# Patient Record
Sex: FEMALE | Race: WHITE | Employment: OTHER | URBAN - METROPOLITAN AREA
[De-identification: names, ages, dates, MRNs, and addresses within clinical notes are randomized per-mention and may not be internally consistent; named-entity substitution may affect disease eponyms.]

---

## 2017-01-04 ENCOUNTER — OFFICE VISIT (OUTPATIENT)
Dept: PAIN MANAGEMENT | Age: 62
End: 2017-01-04

## 2017-01-04 VITALS
BODY MASS INDEX: 42.4 KG/M2 | DIASTOLIC BLOOD PRESSURE: 66 MMHG | WEIGHT: 247 LBS | SYSTOLIC BLOOD PRESSURE: 137 MMHG | HEART RATE: 96 BPM

## 2017-01-04 DIAGNOSIS — G89.4 CHRONIC PAIN SYNDROME: ICD-10-CM

## 2017-01-04 DIAGNOSIS — M79.7 FIBROMYALGIA: ICD-10-CM

## 2017-01-04 DIAGNOSIS — M47.899 FACET JOINT SYNDROME: ICD-10-CM

## 2017-01-04 DIAGNOSIS — F51.01 PRIMARY INSOMNIA: ICD-10-CM

## 2017-01-04 PROCEDURE — 99213 OFFICE O/P EST LOW 20 MIN: CPT | Performed by: INTERNAL MEDICINE

## 2017-01-04 RX ORDER — BUPRENORPHINE 7.5 UG/H
1 PATCH TRANSDERMAL
Qty: 4 PATCH | Refills: 0 | Status: SHIPPED | OUTPATIENT
Start: 2017-01-04 | End: 2019-05-03

## 2017-01-04 RX ORDER — DIVALPROEX SODIUM 125 MG/1
125 CAPSULE, COATED PELLETS ORAL
COMMUNITY
End: 2019-05-03

## 2019-05-03 ENCOUNTER — APPOINTMENT (OUTPATIENT)
Dept: GENERAL RADIOLOGY | Age: 64
DRG: 287 | End: 2019-05-03
Payer: COMMERCIAL

## 2019-05-03 ENCOUNTER — HOSPITAL ENCOUNTER (INPATIENT)
Age: 64
LOS: 2 days | Discharge: HOME | DRG: 287 | End: 2019-05-07
Attending: EMERGENCY MEDICINE | Admitting: INTERNAL MEDICINE
Payer: COMMERCIAL

## 2019-05-03 DIAGNOSIS — R42 LIGHTHEADED: ICD-10-CM

## 2019-05-03 DIAGNOSIS — R07.9 CHEST PAIN, UNSPECIFIED TYPE: Primary | ICD-10-CM

## 2019-05-03 DIAGNOSIS — D64.9 ANEMIA, UNSPECIFIED TYPE: ICD-10-CM

## 2019-05-03 DIAGNOSIS — R06.00 DYSPNEA, UNSPECIFIED TYPE: ICD-10-CM

## 2019-05-03 PROBLEM — N19 ACUTE PRERENAL AZOTEMIA: Status: ACTIVE | Noted: 2019-05-03

## 2019-05-03 PROBLEM — N17.9 ACUTE RENAL FAILURE (ARF) (HCC): Status: ACTIVE | Noted: 2019-05-03

## 2019-05-03 PROBLEM — R07.89 CHEST TIGHTNESS: Status: ACTIVE | Noted: 2019-05-03

## 2019-05-03 PROBLEM — E86.0 DEHYDRATION: Status: ACTIVE | Noted: 2019-05-03

## 2019-05-03 LAB
ANION GAP SERPL CALCULATED.3IONS-SCNC: 16 MMOL/L (ref 3–16)
BASOPHILS ABSOLUTE: 0 K/UL (ref 0–0.2)
BASOPHILS RELATIVE PERCENT: 0.3 %
BILIRUBIN URINE: NEGATIVE
BLOOD, URINE: ABNORMAL
BUN BLDV-MCNC: 23 MG/DL (ref 7–20)
CALCIUM SERPL-MCNC: 8.7 MG/DL (ref 8.3–10.6)
CHLORIDE BLD-SCNC: 106 MMOL/L (ref 99–110)
CLARITY: CLEAR
CO2: 18 MMOL/L (ref 21–32)
COLOR: YELLOW
CREAT SERPL-MCNC: 1.3 MG/DL (ref 0.6–1.2)
D DIMER: 210 NG/ML DDU (ref 0–229)
EOSINOPHILS ABSOLUTE: 0 K/UL (ref 0–0.6)
EOSINOPHILS RELATIVE PERCENT: 0.1 %
EPITHELIAL CELLS, UA: 1 /HPF (ref 0–5)
FERRITIN: 23.2 NG/ML (ref 15–150)
FOLATE: >20 NG/ML (ref 4.78–24.2)
GFR AFRICAN AMERICAN: 50
GFR NON-AFRICAN AMERICAN: 41
GLUCOSE BLD-MCNC: 100 MG/DL (ref 70–99)
GLUCOSE URINE: NEGATIVE MG/DL
HCT VFR BLD CALC: 28.5 % (ref 36–48)
HCT VFR BLD CALC: 28.7 % (ref 36–48)
HEMOGLOBIN: 8.6 G/DL (ref 12–16)
HYALINE CASTS: 0 /LPF (ref 0–8)
IMMATURE RETIC FRACT: 0.42 (ref 0.21–0.37)
IRON SATURATION: 9 % (ref 15–50)
IRON: 43 UG/DL (ref 37–145)
KETONES, URINE: NEGATIVE MG/DL
LACTIC ACID: 1.8 MMOL/L (ref 0.4–2)
LEUKOCYTE ESTERASE, URINE: NEGATIVE
LYMPHOCYTES ABSOLUTE: 1.3 K/UL (ref 1–5.1)
LYMPHOCYTES RELATIVE PERCENT: 11.7 %
MCH RBC QN AUTO: 27.8 PG (ref 26–34)
MCHC RBC AUTO-ENTMCNC: 30 G/DL (ref 31–36)
MCV RBC AUTO: 92.6 FL (ref 80–100)
MICROSCOPIC EXAMINATION: YES
MONOCYTES ABSOLUTE: 1 K/UL (ref 0–1.3)
MONOCYTES RELATIVE PERCENT: 8.7 %
NEUTROPHILS ABSOLUTE: 8.9 K/UL (ref 1.7–7.7)
NEUTROPHILS RELATIVE PERCENT: 79.2 %
NITRITE, URINE: NEGATIVE
PDW BLD-RTO: 14.3 % (ref 12.4–15.4)
PH UA: 5.5 (ref 5–8)
PLATELET # BLD: 183 K/UL (ref 135–450)
PLATELET SLIDE REVIEW: ADEQUATE
PMV BLD AUTO: 7.7 FL (ref 5–10.5)
POTASSIUM REFLEX MAGNESIUM: 4.4 MMOL/L (ref 3.5–5.1)
PRO-BNP: 714 PG/ML (ref 0–124)
PROTEIN UA: NEGATIVE MG/DL
RBC # BLD: 3.1 M/UL (ref 4–5.2)
RBC UA: 7 /HPF (ref 0–4)
RETICULOCYTE ABSOLUTE COUNT: 0.09 M/UL (ref 0.02–0.1)
RETICULOCYTE COUNT PCT: 3.02 % (ref 0.5–2.18)
SLIDE REVIEW: ABNORMAL
SODIUM BLD-SCNC: 140 MMOL/L (ref 136–145)
SPECIFIC GRAVITY UA: 1.01 (ref 1–1.03)
TOTAL IRON BINDING CAPACITY: 477 UG/DL (ref 260–445)
TROPONIN: <0.01 NG/ML
TROPONIN: <0.01 NG/ML
URINE REFLEX TO CULTURE: ABNORMAL
URINE TYPE: ABNORMAL
UROBILINOGEN, URINE: 0.2 E.U./DL
VITAMIN B-12: 441 PG/ML (ref 211–911)
WBC # BLD: 11.2 K/UL (ref 4–11)
WBC UA: 1 /HPF (ref 0–5)

## 2019-05-03 PROCEDURE — 85045 AUTOMATED RETICULOCYTE COUNT: CPT

## 2019-05-03 PROCEDURE — G0378 HOSPITAL OBSERVATION PER HR: HCPCS

## 2019-05-03 PROCEDURE — 83540 ASSAY OF IRON: CPT

## 2019-05-03 PROCEDURE — 6370000000 HC RX 637 (ALT 250 FOR IP): Performed by: PHYSICIAN ASSISTANT

## 2019-05-03 PROCEDURE — 94762 N-INVAS EAR/PLS OXIMTRY CONT: CPT

## 2019-05-03 PROCEDURE — 84238 ASSAY NONENDOCRINE RECEPTOR: CPT

## 2019-05-03 PROCEDURE — 84484 ASSAY OF TROPONIN QUANT: CPT

## 2019-05-03 PROCEDURE — 82607 VITAMIN B-12: CPT

## 2019-05-03 PROCEDURE — 83550 IRON BINDING TEST: CPT

## 2019-05-03 PROCEDURE — 85025 COMPLETE CBC W/AUTO DIFF WBC: CPT

## 2019-05-03 PROCEDURE — 80048 BASIC METABOLIC PNL TOTAL CA: CPT

## 2019-05-03 PROCEDURE — 71046 X-RAY EXAM CHEST 2 VIEWS: CPT

## 2019-05-03 PROCEDURE — 93010 ELECTROCARDIOGRAM REPORT: CPT | Performed by: INTERNAL MEDICINE

## 2019-05-03 PROCEDURE — 83605 ASSAY OF LACTIC ACID: CPT

## 2019-05-03 PROCEDURE — 93005 ELECTROCARDIOGRAM TRACING: CPT | Performed by: PHYSICIAN ASSISTANT

## 2019-05-03 PROCEDURE — 2580000003 HC RX 258: Performed by: INTERNAL MEDICINE

## 2019-05-03 PROCEDURE — 85379 FIBRIN DEGRADATION QUANT: CPT

## 2019-05-03 PROCEDURE — 36415 COLL VENOUS BLD VENIPUNCTURE: CPT

## 2019-05-03 PROCEDURE — 81001 URINALYSIS AUTO W/SCOPE: CPT

## 2019-05-03 PROCEDURE — 96361 HYDRATE IV INFUSION ADD-ON: CPT

## 2019-05-03 PROCEDURE — 82746 ASSAY OF FOLIC ACID SERUM: CPT

## 2019-05-03 PROCEDURE — 96360 HYDRATION IV INFUSION INIT: CPT

## 2019-05-03 PROCEDURE — 82728 ASSAY OF FERRITIN: CPT

## 2019-05-03 PROCEDURE — 6370000000 HC RX 637 (ALT 250 FOR IP): Performed by: INTERNAL MEDICINE

## 2019-05-03 PROCEDURE — 83880 ASSAY OF NATRIURETIC PEPTIDE: CPT

## 2019-05-03 PROCEDURE — 2580000003 HC RX 258: Performed by: PHYSICIAN ASSISTANT

## 2019-05-03 PROCEDURE — 99285 EMERGENCY DEPT VISIT HI MDM: CPT

## 2019-05-03 PROCEDURE — 94640 AIRWAY INHALATION TREATMENT: CPT

## 2019-05-03 RX ORDER — SERTRALINE HYDROCHLORIDE 100 MG/1
200 TABLET, FILM COATED ORAL DAILY
Status: DISCONTINUED | OUTPATIENT
Start: 2019-05-04 | End: 2019-05-07 | Stop reason: HOSPADM

## 2019-05-03 RX ORDER — ERGOCALCIFEROL 1.25 MG/1
50000 CAPSULE ORAL WEEKLY
Status: DISCONTINUED | OUTPATIENT
Start: 2019-05-05 | End: 2019-05-07 | Stop reason: HOSPADM

## 2019-05-03 RX ORDER — ROPINIROLE 0.5 MG/1
0.5 TABLET, FILM COATED ORAL 3 TIMES DAILY
Status: CANCELLED | OUTPATIENT
Start: 2019-05-03

## 2019-05-03 RX ORDER — QUETIAPINE FUMARATE 200 MG/1
800 TABLET, FILM COATED ORAL NIGHTLY
Status: DISCONTINUED | OUTPATIENT
Start: 2019-05-03 | End: 2019-05-07 | Stop reason: HOSPADM

## 2019-05-03 RX ORDER — DIVALPROEX SODIUM 500 MG/1
500 TABLET, EXTENDED RELEASE ORAL 2 TIMES DAILY
COMMUNITY

## 2019-05-03 RX ORDER — TRAMADOL HYDROCHLORIDE 50 MG/1
50 TABLET ORAL EVERY 6 HOURS PRN
COMMUNITY

## 2019-05-03 RX ORDER — ONDANSETRON 2 MG/ML
4 INJECTION INTRAMUSCULAR; INTRAVENOUS EVERY 4 HOURS PRN
Status: DISCONTINUED | OUTPATIENT
Start: 2019-05-03 | End: 2019-05-07 | Stop reason: HOSPADM

## 2019-05-03 RX ORDER — SODIUM CHLORIDE 0.9 % (FLUSH) 0.9 %
10 SYRINGE (ML) INJECTION PRN
Status: DISCONTINUED | OUTPATIENT
Start: 2019-05-03 | End: 2019-05-07 | Stop reason: HOSPADM

## 2019-05-03 RX ORDER — GABAPENTIN 100 MG/1
100 CAPSULE ORAL NIGHTLY
Status: DISCONTINUED | OUTPATIENT
Start: 2019-05-03 | End: 2019-05-07 | Stop reason: HOSPADM

## 2019-05-03 RX ORDER — FOLIC ACID 1 MG/1
1 TABLET ORAL DAILY
Status: DISCONTINUED | OUTPATIENT
Start: 2019-05-04 | End: 2019-05-03

## 2019-05-03 RX ORDER — TIMOLOL MALEATE 5 MG/ML
1 SOLUTION/ DROPS OPHTHALMIC 2 TIMES DAILY
Status: CANCELLED | OUTPATIENT
Start: 2019-05-03

## 2019-05-03 RX ORDER — METOCLOPRAMIDE 10 MG/1
5 TABLET ORAL 4 TIMES DAILY
Status: DISCONTINUED | OUTPATIENT
Start: 2019-05-03 | End: 2019-05-03

## 2019-05-03 RX ORDER — PALIPERIDONE 3 MG/1
6 TABLET, EXTENDED RELEASE ORAL EVERY MORNING
Status: DISCONTINUED | OUTPATIENT
Start: 2019-05-04 | End: 2019-05-07 | Stop reason: HOSPADM

## 2019-05-03 RX ORDER — LEVOTHYROXINE SODIUM 0.05 MG/1
50 TABLET ORAL
Status: DISCONTINUED | OUTPATIENT
Start: 2019-05-04 | End: 2019-05-07 | Stop reason: HOSPADM

## 2019-05-03 RX ORDER — IPRATROPIUM BROMIDE AND ALBUTEROL SULFATE 2.5; .5 MG/3ML; MG/3ML
1 SOLUTION RESPIRATORY (INHALATION) ONCE
Status: COMPLETED | OUTPATIENT
Start: 2019-05-03 | End: 2019-05-03

## 2019-05-03 RX ORDER — TIZANIDINE 4 MG/1
4 TABLET ORAL EVERY 6 HOURS PRN
Status: DISCONTINUED | OUTPATIENT
Start: 2019-05-03 | End: 2019-05-03

## 2019-05-03 RX ORDER — 0.9 % SODIUM CHLORIDE 0.9 %
1000 INTRAVENOUS SOLUTION INTRAVENOUS ONCE
Status: COMPLETED | OUTPATIENT
Start: 2019-05-03 | End: 2019-05-03

## 2019-05-03 RX ORDER — ACETAMINOPHEN 325 MG/1
650 TABLET ORAL EVERY 4 HOURS PRN
Status: DISCONTINUED | OUTPATIENT
Start: 2019-05-03 | End: 2019-05-07 | Stop reason: HOSPADM

## 2019-05-03 RX ORDER — DIVALPROEX SODIUM 500 MG/1
500 TABLET, EXTENDED RELEASE ORAL 2 TIMES DAILY
Status: DISCONTINUED | OUTPATIENT
Start: 2019-05-03 | End: 2019-05-07 | Stop reason: HOSPADM

## 2019-05-03 RX ORDER — PANTOPRAZOLE SODIUM 40 MG/1
40 TABLET, DELAYED RELEASE ORAL
Status: DISCONTINUED | OUTPATIENT
Start: 2019-05-04 | End: 2019-05-04

## 2019-05-03 RX ORDER — ZONISAMIDE 100 MG/1
200 CAPSULE ORAL DAILY
Status: CANCELLED | OUTPATIENT
Start: 2019-05-03

## 2019-05-03 RX ORDER — GABAPENTIN 100 MG/1
100 CAPSULE ORAL NIGHTLY
COMMUNITY

## 2019-05-03 RX ORDER — TRAMADOL HYDROCHLORIDE 50 MG/1
50 TABLET ORAL EVERY 6 HOURS PRN
Status: DISCONTINUED | OUTPATIENT
Start: 2019-05-03 | End: 2019-05-07 | Stop reason: HOSPADM

## 2019-05-03 RX ORDER — BUPROPION HYDROCHLORIDE 150 MG/1
150 TABLET ORAL EVERY MORNING
Status: CANCELLED | OUTPATIENT
Start: 2019-05-04

## 2019-05-03 RX ORDER — PREDNISONE 20 MG/1
60 TABLET ORAL ONCE
Status: COMPLETED | OUTPATIENT
Start: 2019-05-03 | End: 2019-05-03

## 2019-05-03 RX ORDER — QUETIAPINE FUMARATE 200 MG/1
400 TABLET, FILM COATED ORAL 2 TIMES DAILY
Status: CANCELLED | OUTPATIENT
Start: 2019-05-03

## 2019-05-03 RX ORDER — LEVOTHYROXINE SODIUM 0.05 MG/1
50 TABLET ORAL DAILY
COMMUNITY

## 2019-05-03 RX ORDER — DICYCLOMINE HYDROCHLORIDE 10 MG/1
10 CAPSULE ORAL
Status: DISCONTINUED | OUTPATIENT
Start: 2019-05-03 | End: 2019-05-07 | Stop reason: HOSPADM

## 2019-05-03 RX ORDER — METOCLOPRAMIDE 5 MG/1
5 TABLET ORAL 4 TIMES DAILY
COMMUNITY

## 2019-05-03 RX ORDER — TIZANIDINE 4 MG/1
4 TABLET ORAL EVERY 6 HOURS PRN
COMMUNITY

## 2019-05-03 RX ORDER — METHOCARBAMOL 500 MG/1
500 TABLET, FILM COATED ORAL 4 TIMES DAILY
Status: CANCELLED | OUTPATIENT
Start: 2019-05-03

## 2019-05-03 RX ORDER — DOCUSATE SODIUM 100 MG/1
100 CAPSULE, LIQUID FILLED ORAL 2 TIMES DAILY
Status: DISCONTINUED | OUTPATIENT
Start: 2019-05-03 | End: 2019-05-07 | Stop reason: HOSPADM

## 2019-05-03 RX ORDER — GABAPENTIN 100 MG/1
100 CAPSULE ORAL NIGHTLY
Status: DISCONTINUED | OUTPATIENT
Start: 2019-05-03 | End: 2019-05-03

## 2019-05-03 RX ORDER — FOLIC ACID 1 MG/1
1 TABLET ORAL DAILY
Status: DISCONTINUED | OUTPATIENT
Start: 2019-05-04 | End: 2019-05-07 | Stop reason: HOSPADM

## 2019-05-03 RX ORDER — FOLIC ACID 1 MG/1
1 TABLET ORAL DAILY
COMMUNITY

## 2019-05-03 RX ORDER — METOCLOPRAMIDE 10 MG/1
5 TABLET ORAL
Status: DISCONTINUED | OUTPATIENT
Start: 2019-05-03 | End: 2019-05-04

## 2019-05-03 RX ORDER — PALIPERIDONE 6 MG/1
6 TABLET, EXTENDED RELEASE ORAL EVERY MORNING
COMMUNITY

## 2019-05-03 RX ORDER — QUETIAPINE FUMARATE 200 MG/1
800 TABLET, FILM COATED ORAL NIGHTLY
Status: DISCONTINUED | OUTPATIENT
Start: 2019-05-03 | End: 2019-05-03

## 2019-05-03 RX ORDER — SODIUM CHLORIDE 0.9 % (FLUSH) 0.9 %
10 SYRINGE (ML) INJECTION EVERY 12 HOURS SCHEDULED
Status: DISCONTINUED | OUTPATIENT
Start: 2019-05-03 | End: 2019-05-07 | Stop reason: HOSPADM

## 2019-05-03 RX ORDER — PALIPERIDONE 3 MG/1
6 TABLET, EXTENDED RELEASE ORAL DAILY
Status: DISCONTINUED | OUTPATIENT
Start: 2019-05-04 | End: 2019-05-03

## 2019-05-03 RX ORDER — CHOLECALCIFEROL (VITAMIN D3) 1250 MCG
1 CAPSULE ORAL WEEKLY
COMMUNITY

## 2019-05-03 RX ORDER — LEVOTHYROXINE SODIUM 0.05 MG/1
50 TABLET ORAL DAILY
Status: DISCONTINUED | OUTPATIENT
Start: 2019-05-04 | End: 2019-05-03

## 2019-05-03 RX ORDER — DOCUSATE SODIUM 100 MG/1
100 CAPSULE, LIQUID FILLED ORAL 2 TIMES DAILY
COMMUNITY

## 2019-05-03 RX ORDER — SODIUM CHLORIDE 9 MG/ML
INJECTION, SOLUTION INTRAVENOUS CONTINUOUS
Status: DISCONTINUED | OUTPATIENT
Start: 2019-05-03 | End: 2019-05-04

## 2019-05-03 RX ORDER — DIVALPROEX SODIUM 500 MG/1
500 TABLET, EXTENDED RELEASE ORAL 2 TIMES DAILY
Status: DISCONTINUED | OUTPATIENT
Start: 2019-05-03 | End: 2019-05-03

## 2019-05-03 RX ORDER — ROPINIROLE 1 MG/1
1 TABLET, FILM COATED ORAL 3 TIMES DAILY
Status: DISCONTINUED | OUTPATIENT
Start: 2019-05-03 | End: 2019-05-07 | Stop reason: HOSPADM

## 2019-05-03 RX ORDER — PRAMIPEXOLE DIHYDROCHLORIDE 0.5 MG/1
1 TABLET ORAL 2 TIMES DAILY
Status: CANCELLED | OUTPATIENT
Start: 2019-05-03

## 2019-05-03 RX ORDER — DOCUSATE SODIUM 100 MG/1
100 CAPSULE, LIQUID FILLED ORAL DAILY
Status: DISCONTINUED | OUTPATIENT
Start: 2019-05-03 | End: 2019-05-03

## 2019-05-03 RX ADMIN — CARBIDOPA AND LEVODOPA 1 TABLET: 25; 100 TABLET ORAL at 21:26

## 2019-05-03 RX ADMIN — ACETAMINOPHEN 650 MG: 325 TABLET ORAL at 23:46

## 2019-05-03 RX ADMIN — SODIUM CHLORIDE 1000 ML: 9 INJECTION, SOLUTION INTRAVENOUS at 15:28

## 2019-05-03 RX ADMIN — SODIUM CHLORIDE: 9 INJECTION, SOLUTION INTRAVENOUS at 16:53

## 2019-05-03 RX ADMIN — ROPINIROLE HYDROCHLORIDE 1 MG: 1 TABLET, FILM COATED ORAL at 21:26

## 2019-05-03 RX ADMIN — APIXABAN 5 MG: 5 TABLET, FILM COATED ORAL at 21:26

## 2019-05-03 RX ADMIN — DOCUSATE SODIUM 100 MG: 100 CAPSULE, LIQUID FILLED ORAL at 17:33

## 2019-05-03 RX ADMIN — DOCUSATE SODIUM 100 MG: 100 CAPSULE, LIQUID FILLED ORAL at 21:27

## 2019-05-03 RX ADMIN — GABAPENTIN 100 MG: 100 CAPSULE ORAL at 21:26

## 2019-05-03 RX ADMIN — IPRATROPIUM BROMIDE AND ALBUTEROL SULFATE 1 AMPULE: .5; 3 SOLUTION RESPIRATORY (INHALATION) at 12:39

## 2019-05-03 RX ADMIN — METOCLOPRAMIDE HYDROCHLORIDE 5 MG: 10 TABLET ORAL at 21:26

## 2019-05-03 RX ADMIN — DICYCLOMINE HYDROCHLORIDE 10 MG: 10 CAPSULE ORAL at 17:33

## 2019-05-03 RX ADMIN — TRAMADOL HYDROCHLORIDE 50 MG: 50 TABLET, FILM COATED ORAL at 20:22

## 2019-05-03 RX ADMIN — QUETIAPINE FUMARATE 800 MG: 200 TABLET ORAL at 21:25

## 2019-05-03 RX ADMIN — DIVALPROEX SODIUM 500 MG: 500 TABLET, EXTENDED RELEASE ORAL at 21:26

## 2019-05-03 RX ADMIN — PREDNISONE 60 MG: 20 TABLET ORAL at 14:45

## 2019-05-03 ASSESSMENT — ENCOUNTER SYMPTOMS
DIARRHEA: 0
NAUSEA: 0
VOMITING: 0
COUGH: 0
CHEST TIGHTNESS: 1
EYE PAIN: 0
BACK PAIN: 0
ABDOMINAL PAIN: 0
SHORTNESS OF BREATH: 1

## 2019-05-03 ASSESSMENT — PAIN DESCRIPTION - ONSET
ONSET: ON-GOING

## 2019-05-03 ASSESSMENT — PAIN SCALES - GENERAL
PAINLEVEL_OUTOF10: 4
PAINLEVEL_OUTOF10: 6
PAINLEVEL_OUTOF10: 7
PAINLEVEL_OUTOF10: 6
PAINLEVEL_OUTOF10: 6
PAINLEVEL_OUTOF10: 7

## 2019-05-03 ASSESSMENT — PAIN DESCRIPTION - FREQUENCY
FREQUENCY: CONTINUOUS

## 2019-05-03 ASSESSMENT — PAIN DESCRIPTION - PROGRESSION
CLINICAL_PROGRESSION: NOT CHANGED

## 2019-05-03 ASSESSMENT — PAIN DESCRIPTION - DESCRIPTORS
DESCRIPTORS: DULL;OTHER (COMMENT)
DESCRIPTORS: DULL
DESCRIPTORS: TIGHTNESS;ACHING
DESCRIPTORS: DULL

## 2019-05-03 ASSESSMENT — PAIN DESCRIPTION - LOCATION
LOCATION: CHEST
LOCATION: BACK
LOCATION: ABDOMEN
LOCATION: CHEST;LEG

## 2019-05-03 ASSESSMENT — PAIN DESCRIPTION - PAIN TYPE
TYPE: ACUTE PAIN
TYPE: ACUTE PAIN;CHRONIC PAIN
TYPE: ACUTE PAIN
TYPE: ACUTE PAIN

## 2019-05-03 ASSESSMENT — PAIN - FUNCTIONAL ASSESSMENT
PAIN_FUNCTIONAL_ASSESSMENT: PREVENTS OR INTERFERES SOME ACTIVE ACTIVITIES AND ADLS

## 2019-05-03 ASSESSMENT — HEART SCORE: ECG: 0

## 2019-05-03 ASSESSMENT — PAIN DESCRIPTION - DIRECTION: RADIATING_TOWARDS: BOTH SIDES

## 2019-05-03 ASSESSMENT — PAIN DESCRIPTION - ORIENTATION
ORIENTATION: RIGHT;LEFT;LOWER
ORIENTATION: MID;UPPER

## 2019-05-03 NOTE — H&P
Hospital Medicine  History and Physical    PCP: Leandra Zhao  Patient Name: Bridger Julien    Date of Service: Pt seen/examined on 05/03/2019 and Placed in Observation. CHIEF COMPLAINT:  Pt c/o chest tightness, lightheadedness   HISTORY OF PRESENT ILLNESS: Patient is a 60-year-old woman with a history of back issues, fibromyalgia and chronic pain syndrome who presents to the emergency room for evaluation following a 2 to 3 day history of increased shortness of breath, light headedness, fatigue and chest tightness. She states that her chest tightness is moderately severe, located in the middle of her chest and does not radiate. She denies symptoms of vertigo. In the emergency room she was found to have dehydration and acute renal failure. Her initial EKG did not show ischemic changes in her initial troponin was not elevated. She is being admitted for further evaluation and treatment. Associated signs and symptoms do not include typical chest pain, shortness of breath, diaphoresis, edema, orthopnea, paroxysmal nocturnal dyspnea, fever or chills. Past Medical History:        Diagnosis Date    Arthritis     Asthma     Glaucoma     Hypertension     Migraine     Neuropathy     Thyroid disease     Ulcer disease        Past Surgical History:        Procedure Laterality Date    ANKLE FRACTURE SURGERY      FOOT SURGERY      GASTRIC BYPASS SURGERY      SHOULDER SURGERY      rotator cuff    WRIST FRACTURE SURGERY         Allergies:  Procaine hcl    Medications Prior to Admission:    Prior to Admission medications    Medication Sig Start Date End Date Taking?  Authorizing Provider   apixaban (ELIQUIS) 5 MG TABS tablet Take by mouth 2 times daily   Yes Historical Provider, MD   divalproex (DEPAKOTE ER) 500 MG extended release tablet Take 500 mg by mouth 2 times daily   Yes Historical Provider, MD   metoclopramide (REGLAN) 5 MG tablet Take 5 mg by mouth 4 times daily   Yes Historical Provider, MD Mother     Asthma Mother     Heart Disease Mother     Arthritis Father     Cancer Father     Heart Disease Father      Social History:   TOBACCO:   reports that she has never smoked. She has never used smokeless tobacco.  ETOH:   reports that she does not drink alcohol. OCCUPATION:      REVIEW OF SYSTEMS:  A full review of systems was performed and is negative except for that which appears in the HPI    Physical Exam:    Vitals: /73   Pulse 93   Temp 98.7 °F (37.1 °C) (Oral)   Resp 14   Ht 5' 2\" (1.575 m)   Wt 244 lb 14.9 oz (111.1 kg)   SpO2 95%   Breastfeeding? No   BMI 44.80 kg/m²   General appearance: WD/WN 59y.o. year-old  female who is alert, appears stated age and is cooperative  HEENT: Head: Normocephalic, no lesions, without obvious abnormality. Eye: Normal external eye, conjunctiva, lids cornea, ANA PAULA. Ears: Normal external ears. Non-tender. Nose: Normal external nose, mucus membranes and septum. Pharynx: Dental Hygiene adequate. Normal buccal mucosa. Normal pharynx. Neck: no adenopathy, no carotid bruit, no JVD, supple, symmetrical, trachea midline and thyroid not enlarged, symmetric, no tenderness/mass/nodules  Lungs: clear to auscultation bilaterally and no use of accessory muscles. Heart: regular rate and rhythm, S1, S2 normal, no murmur, click, rub or gallop and normal apical impulse  Abdomen: soft, non-tender; bowel sounds normal; no masses,  no organomegaly  Extremities: extremities atraumatic, no cyanosis or edema and Homans sign is negative, no sign of DVT. Capillary Refill: Acceptable < 3 seconds  Peripheral Pulses: +3 easily felt, not easily obliterated with pressures  Skin: Skin color, texture, turgor normal. No rashes or lesions on exposed skin  Neurologic: Neurovascularly intact without any focal sensory/motor deficits. Cranial nerves: II-XII intact, grossly non-focal. Gait was not tested.   Psychiatric: Alert and oriented, thought content appropriate, normal insight    CBC:   Recent Labs     05/03/19  1330   WBC 11.2*   HGB 8.6*        BMP:    Recent Labs     05/03/19  1330      K 4.4      CO2 18*   BUN 23*   CREATININE 1.3*   GLUCOSE 100*     Troponin:   Recent Labs     05/03/19  1330   TROPONINI <0.01     PT/INR:  No results found for: PTINR  U/A:    Lab Results   Component Value Date    LEUKOCYTESUR NEGATIVE 08/19/2010    SPECGRAV 1.010 08/19/2010    UROBILINOGEN 0.2 08/19/2010    BILIRUBINUR NEGATIVE 08/19/2010    BLOODU NEGATIVE 08/19/2010    GLUCOSEU NEGATIVE 08/19/2010    PROTEINU NEGATIVE 08/19/2010         RAD:   I have independently reviewed and interpreted the imaging studies below and based my recommendations to the patient on those findings. Xr Chest Standard (2 Vw)    Result Date: 5/3/2019  EXAMINATION: TWO VIEWS OF THE CHEST 5/3/2019 1:11 pm COMPARISON: September 12, 2012 HISTORY: ORDERING SYSTEM PROVIDED HISTORY: SOB, chest tightness, h/o asthma, lightheaded TECHNOLOGIST PROVIDED HISTORY: Reason for exam:->SOB, chest tightness, h/o asthma, lightheaded Ordering Physician Provided Reason for Exam: SOB, chest tightness, h/o asthma, lightheaded x 2 days Acuity: Acute Type of Exam: Initial FINDINGS: No evidence of pneumonia, edema or other acute pulmonary process. No evidence of acute process of the cardiac or mediastinal structures. No evidence of pneumothorax or pleural effusion. No evidence of acute cardiopulmonary disease. EKG:   Read by ER in the absence of a Cardiologist shows sinus rhythm at a rate of 97 axis is 34. Diffuse nonspecific T wave flattening throughout but no acute injury ischemia and intervals appear normal.      Assessment:   Principal Problem:    Chest tightness  Active Problems:    Dehydration    Acute renal failure (ARF) (HCC)    Acute prerenal azotemia    Anemia    Light headedness  Resolved Problems:    * No resolved hospital problems.  *      Plan:       Chest tightness - Chest pain - Patient will be admitted and monitored on telemetry, and we will check serial cardiac enzymes. Aspirin was started in the Emergency Room. Cardiac testing has been ordered for risk stratification. I have discussed other possible etiologies of the symptoms such as Musculoskeletal Pain and GERD, including unique presenting characteritics of each. Patient understands that if the evaluation does not show that the symptoms are secondary to ischemic changes, she will be discharged and instructed to follow up with her outpatient physician to help determine the etiology of her symptoms. Dehydration - likely secondary to poor oral intake. Hydrate with IV Normal Saline and monitor. Acute renal failure - due to dehydration. Will hydrate and avoid nephrotoxic agents. Prerenal azotemia - likely due to dehydration and ARF. Will monitor and expect to improve with hydration. Light headedness - possible etiologies include dehydration > anemia. Will monitor with hydration    Anemia - Etiology unclear; will check stool for Occult Blood, Reticulocyte Count, Soluble transferrin Receptor, Iron, TIBC, Ferritin, B12 and Folate. No indication for transfusion at this time. Continue to monitor Hemoglobin and Hematocrit and transfuse as necessary to maintain a Hemoglobin of 7 - 8 g/dL. DVT Prophylaxis: Eliquis  Diet: DIET GENERAL;  Diet NPO, After Midnight  Code Status: Full Code  (Advanced care planning has been discussed with patient and/or responsible family member and is reflected in the code status. Further orders associated with this have been entered if appropriate)    Disposition: Anticipate that patient will remain in the hospital for 1 to 2 days depending on further evaluation and clinical course.      Ry Sandoval MD

## 2019-05-03 NOTE — ED PROVIDER NOTES
[facility]  eMERGENCY dEPARTMENT eNCOUnter   Physician Attestation    Pt Name: Bridger Julien  MRN: [id number]  [place] 1955  Date of evaluation: 5/3/19        Physician Note:    I havepersonally performed and/or participated in the history, exam and medical decision making and agree with all pertinent clinical information. I have also reviewed and agree with the past medical, family and social historyunless otherwise noted. I have personally performed a face to face diagnostic evaluation onthis patient. I have reviewed the mid-levels findings and agree. History: This is a 71-year-old female presents complaining of shortness of breath and lightheadedness for about a week. Also complaining of heaviness in the middle of her chest it feels like somebody is standing on her heart. She is having numbness down the left arm. She has been going to physical therapy because of previous cervical spine surgery. Physical Exam   Constitutional: She is oriented to person, place, and time. She appears well-developed and well-nourished. HENT:   Head: Normocephalic and atraumatic. Right Ear: External ear normal.   Left Ear: External ear normal.   Eyes: Conjunctivae are normal. Right eye exhibits no discharge. Left eye exhibits no discharge. No scleral icterus. Neck: Normal range of motion. No tracheal deviation present. Cardiovascular: Regular rhythm. Tachycardia present. Pulmonary/Chest: Effort normal and breath sounds normal. No stridor. No respiratory distress. Musculoskeletal: Normal range of motion. Neurological: She is alert and oriented to person, place, and time. Coordination normal.   Skin: Skin is warm and dry. She is not diaphoretic. Psychiatric: She has a normal mood and affect. Her behavior is normal.   Nursing note and vitals reviewed. EKG visualized preliminarily interpreted by myself shows sinus rhythm at a rate of 97 axis is 34.   Diffuse nonspecific T wave flattening throughout but no acute injury ischemia and intervals appear normal.    1. Chest pain, unspecified type    2. Dyspnea, unspecified type    3. Anemia, unspecified type    4. Lightheaded          DISPOSITION/PLAN  PATIENT REFERRED TO:  No follow-up provider specified.   DISCHARGE MEDICATIONS:  New Prescriptions    No medications on file         MD Mark Haas MD  05/03/19 2509

## 2019-05-03 NOTE — ED TRIAGE NOTES
Pt is a 59year old female who presents in the ED today via EMS for fatigue, dizziness and shortness of breath that has been ongoing for a couple of days. Pt states she had 5-7 epidural shots yesterday. Pt has chronic pain issues but is currently not complaining of any pain. Pt skin is warm and dry. Pt breathing is mildly labored.

## 2019-05-03 NOTE — PROGRESS NOTES
59year old female admitted to room 4106 with chest pain. Rates pain 6/10 at this time. Denies nausea. Respirations easy. Patient oriented to room. Call light in reach. Will monitor.  Electronically signed by Charlotte Lai RN on 5/3/2019 at 4:47 PM

## 2019-05-03 NOTE — PROGRESS NOTES
4 Eyes Skin Assessment     The patient is being assess for  Admission    I agree that 2 RN's have performed a thorough Head to Toe Skin Assessment on the patient. ALL assessment sites listed below have been assessed. Areas assessed by both nurses:   [x]   Head, Face, and Ears   [x]   Shoulders, Back, and Chest  [x]   Arms, Elbows, and Hands   [x]   Coccyx, Sacrum, and IschIum  [x]   Legs, Feet, and Heels        Does the Patient have Skin Breakdown?   No         Bertin Prevention initiated:  NA   Wound Care Orders initiated:  NA      [facility] nurse consulted for Pressure Injury (Stage 3,4, Unstageable, DTI, NWPT, and Complex wounds), New and Established Ostomies:  NA      Nurse 1 eSignature: Electronically signed by Floyd Jorgensen RN on 5/3/19 at 7:06 PM    **SHARE this note so that the co-signing nurse is able to place an eSignature**    Nurse 2 eSignature: Electronically signed by Liberty Holguin RN on 5/3/19 at 7:21 PM

## 2019-05-04 LAB
ANION GAP SERPL CALCULATED.3IONS-SCNC: 12 MMOL/L (ref 3–16)
BASOPHILS ABSOLUTE: 0 K/UL (ref 0–0.2)
BASOPHILS RELATIVE PERCENT: 0.4 %
BUN BLDV-MCNC: 25 MG/DL (ref 7–20)
CALCIUM SERPL-MCNC: 8.7 MG/DL (ref 8.3–10.6)
CHLORIDE BLD-SCNC: 110 MMOL/L (ref 99–110)
CO2: 20 MMOL/L (ref 21–32)
CREAT SERPL-MCNC: 1.4 MG/DL (ref 0.6–1.2)
EOSINOPHILS ABSOLUTE: 0 K/UL (ref 0–0.6)
EOSINOPHILS RELATIVE PERCENT: 0.1 %
GFR AFRICAN AMERICAN: 46
GFR NON-AFRICAN AMERICAN: 38
GLUCOSE BLD-MCNC: 106 MG/DL (ref 70–99)
HCT VFR BLD CALC: 26.3 % (ref 36–48)
HEMOGLOBIN: 8.6 G/DL (ref 12–16)
LV EF: 68 %
LVEF MODALITY: NORMAL
LYMPHOCYTES ABSOLUTE: 0.8 K/UL (ref 1–5.1)
LYMPHOCYTES RELATIVE PERCENT: 9.6 %
MCH RBC QN AUTO: 30.1 PG (ref 26–34)
MCHC RBC AUTO-ENTMCNC: 32.8 G/DL (ref 31–36)
MCV RBC AUTO: 91.8 FL (ref 80–100)
MONOCYTES ABSOLUTE: 0.6 K/UL (ref 0–1.3)
MONOCYTES RELATIVE PERCENT: 7.9 %
NEUTROPHILS ABSOLUTE: 6.7 K/UL (ref 1.7–7.7)
NEUTROPHILS RELATIVE PERCENT: 82 %
PDW BLD-RTO: 14.1 % (ref 12.4–15.4)
PLATELET # BLD: 200 K/UL (ref 135–450)
PMV BLD AUTO: 7 FL (ref 5–10.5)
POTASSIUM REFLEX MAGNESIUM: 4.7 MMOL/L (ref 3.5–5.1)
RBC # BLD: 2.86 M/UL (ref 4–5.2)
SODIUM BLD-SCNC: 142 MMOL/L (ref 136–145)
TROPONIN: <0.01 NG/ML
TROPONIN: <0.01 NG/ML
WBC # BLD: 8.2 K/UL (ref 4–11)

## 2019-05-04 PROCEDURE — 3430000000 HC RX DIAGNOSTIC RADIOPHARMACEUTICAL: Performed by: INTERNAL MEDICINE

## 2019-05-04 PROCEDURE — 96361 HYDRATE IV INFUSION ADD-ON: CPT

## 2019-05-04 PROCEDURE — 36415 COLL VENOUS BLD VENIPUNCTURE: CPT

## 2019-05-04 PROCEDURE — G0378 HOSPITAL OBSERVATION PER HR: HCPCS

## 2019-05-04 PROCEDURE — 6360000002 HC RX W HCPCS: Performed by: INTERNAL MEDICINE

## 2019-05-04 PROCEDURE — 6370000000 HC RX 637 (ALT 250 FOR IP): Performed by: NURSE PRACTITIONER

## 2019-05-04 PROCEDURE — 96375 TX/PRO/DX INJ NEW DRUG ADDON: CPT

## 2019-05-04 PROCEDURE — 3430000000 HC RX DIAGNOSTIC RADIOPHARMACEUTICAL: Performed by: HOSPITALIST

## 2019-05-04 PROCEDURE — 2580000003 HC RX 258: Performed by: NURSE PRACTITIONER

## 2019-05-04 PROCEDURE — 80048 BASIC METABOLIC PNL TOTAL CA: CPT

## 2019-05-04 PROCEDURE — C9113 INJ PANTOPRAZOLE SODIUM, VIA: HCPCS | Performed by: NURSE PRACTITIONER

## 2019-05-04 PROCEDURE — 2580000003 HC RX 258: Performed by: INTERNAL MEDICINE

## 2019-05-04 PROCEDURE — 6360000002 HC RX W HCPCS: Performed by: NURSE PRACTITIONER

## 2019-05-04 PROCEDURE — 96374 THER/PROPH/DIAG INJ IV PUSH: CPT

## 2019-05-04 PROCEDURE — 78452 HT MUSCLE IMAGE SPECT MULT: CPT

## 2019-05-04 PROCEDURE — 6370000000 HC RX 637 (ALT 250 FOR IP): Performed by: INTERNAL MEDICINE

## 2019-05-04 PROCEDURE — 85025 COMPLETE CBC W/AUTO DIFF WBC: CPT

## 2019-05-04 PROCEDURE — 84484 ASSAY OF TROPONIN QUANT: CPT

## 2019-05-04 PROCEDURE — A9502 TC99M TETROFOSMIN: HCPCS | Performed by: HOSPITALIST

## 2019-05-04 PROCEDURE — 93017 CV STRESS TEST TRACING ONLY: CPT

## 2019-05-04 PROCEDURE — A9502 TC99M TETROFOSMIN: HCPCS | Performed by: INTERNAL MEDICINE

## 2019-05-04 RX ORDER — PANTOPRAZOLE SODIUM 40 MG/10ML
40 INJECTION, POWDER, LYOPHILIZED, FOR SOLUTION INTRAVENOUS 2 TIMES DAILY
Status: DISCONTINUED | OUTPATIENT
Start: 2019-05-04 | End: 2019-05-07 | Stop reason: HOSPADM

## 2019-05-04 RX ORDER — 0.9 % SODIUM CHLORIDE 0.9 %
10 VIAL (ML) INJECTION 2 TIMES DAILY
Status: DISCONTINUED | OUTPATIENT
Start: 2019-05-04 | End: 2019-05-07 | Stop reason: HOSPADM

## 2019-05-04 RX ORDER — METOCLOPRAMIDE HYDROCHLORIDE 5 MG/ML
10 INJECTION INTRAMUSCULAR; INTRAVENOUS EVERY 6 HOURS
Status: COMPLETED | OUTPATIENT
Start: 2019-05-04 | End: 2019-05-05

## 2019-05-04 RX ORDER — PANTOPRAZOLE SODIUM 40 MG/1
40 TABLET, DELAYED RELEASE ORAL
Status: DISCONTINUED | OUTPATIENT
Start: 2019-05-04 | End: 2019-05-04

## 2019-05-04 RX ORDER — SODIUM CHLORIDE, SODIUM LACTATE, POTASSIUM CHLORIDE, CALCIUM CHLORIDE 600; 310; 30; 20 MG/100ML; MG/100ML; MG/100ML; MG/100ML
INJECTION, SOLUTION INTRAVENOUS CONTINUOUS
Status: DISCONTINUED | OUTPATIENT
Start: 2019-05-04 | End: 2019-05-07 | Stop reason: HOSPADM

## 2019-05-04 RX ADMIN — DOCUSATE SODIUM 100 MG: 100 CAPSULE, LIQUID FILLED ORAL at 11:54

## 2019-05-04 RX ADMIN — TRAMADOL HYDROCHLORIDE 50 MG: 50 TABLET, FILM COATED ORAL at 12:05

## 2019-05-04 RX ADMIN — REGADENOSON 0.4 MG: 0.08 INJECTION, SOLUTION INTRAVENOUS at 09:43

## 2019-05-04 RX ADMIN — APIXABAN 5 MG: 5 TABLET, FILM COATED ORAL at 11:54

## 2019-05-04 RX ADMIN — DIVALPROEX SODIUM 500 MG: 500 TABLET, EXTENDED RELEASE ORAL at 21:44

## 2019-05-04 RX ADMIN — DICYCLOMINE HYDROCHLORIDE 10 MG: 10 CAPSULE ORAL at 15:48

## 2019-05-04 RX ADMIN — DOCUSATE SODIUM 100 MG: 100 CAPSULE, LIQUID FILLED ORAL at 21:44

## 2019-05-04 RX ADMIN — GABAPENTIN 100 MG: 100 CAPSULE ORAL at 21:44

## 2019-05-04 RX ADMIN — PANTOPRAZOLE SODIUM 40 MG: 40 INJECTION, POWDER, LYOPHILIZED, FOR SOLUTION INTRAVENOUS at 21:30

## 2019-05-04 RX ADMIN — CARBIDOPA AND LEVODOPA 1 TABLET: 25; 100 TABLET ORAL at 11:53

## 2019-05-04 RX ADMIN — SODIUM CHLORIDE: 9 INJECTION, SOLUTION INTRAVENOUS at 06:00

## 2019-05-04 RX ADMIN — QUETIAPINE FUMARATE 800 MG: 200 TABLET ORAL at 21:44

## 2019-05-04 RX ADMIN — DIVALPROEX SODIUM 500 MG: 500 TABLET, EXTENDED RELEASE ORAL at 11:54

## 2019-05-04 RX ADMIN — PALIPERIDONE 6 MG: 3 TABLET, EXTENDED RELEASE ORAL at 12:00

## 2019-05-04 RX ADMIN — ONDANSETRON 4 MG: 2 INJECTION INTRAMUSCULAR; INTRAVENOUS at 12:22

## 2019-05-04 RX ADMIN — LIDOCAINE HYDROCHLORIDE: 20 SOLUTION ORAL; TOPICAL at 15:45

## 2019-05-04 RX ADMIN — ROPINIROLE HYDROCHLORIDE 1 MG: 1 TABLET, FILM COATED ORAL at 11:54

## 2019-05-04 RX ADMIN — FOLIC ACID 1 MG: 1 TABLET ORAL at 11:54

## 2019-05-04 RX ADMIN — ROPINIROLE HYDROCHLORIDE 1 MG: 1 TABLET, FILM COATED ORAL at 21:44

## 2019-05-04 RX ADMIN — METOCLOPRAMIDE HYDROCHLORIDE 5 MG: 10 TABLET ORAL at 15:49

## 2019-05-04 RX ADMIN — APIXABAN 5 MG: 5 TABLET, FILM COATED ORAL at 21:44

## 2019-05-04 RX ADMIN — DICYCLOMINE HYDROCHLORIDE 10 MG: 10 CAPSULE ORAL at 06:00

## 2019-05-04 RX ADMIN — TRAMADOL HYDROCHLORIDE 50 MG: 50 TABLET, FILM COATED ORAL at 06:00

## 2019-05-04 RX ADMIN — METOCLOPRAMIDE 10 MG: 5 INJECTION, SOLUTION INTRAMUSCULAR; INTRAVENOUS at 19:33

## 2019-05-04 RX ADMIN — METOCLOPRAMIDE HYDROCHLORIDE 5 MG: 10 TABLET ORAL at 11:58

## 2019-05-04 RX ADMIN — Medication 10 ML: at 21:48

## 2019-05-04 RX ADMIN — DICYCLOMINE HYDROCHLORIDE 10 MG: 10 CAPSULE ORAL at 11:59

## 2019-05-04 RX ADMIN — SERTRALINE 200 MG: 100 TABLET, FILM COATED ORAL at 11:53

## 2019-05-04 RX ADMIN — SODIUM CHLORIDE, POTASSIUM CHLORIDE, SODIUM LACTATE AND CALCIUM CHLORIDE: 600; 310; 30; 20 INJECTION, SOLUTION INTRAVENOUS at 19:33

## 2019-05-04 RX ADMIN — TETROFOSMIN 10 MILLICURIE: 0.23 INJECTION, POWDER, LYOPHILIZED, FOR SOLUTION INTRAVENOUS at 07:45

## 2019-05-04 RX ADMIN — TRAMADOL HYDROCHLORIDE 50 MG: 50 TABLET, FILM COATED ORAL at 21:44

## 2019-05-04 RX ADMIN — TETROFOSMIN 30 MILLICURIE: 0.23 INJECTION, POWDER, LYOPHILIZED, FOR SOLUTION INTRAVENOUS at 10:50

## 2019-05-04 RX ADMIN — PANTOPRAZOLE SODIUM 40 MG: 40 TABLET, DELAYED RELEASE ORAL at 06:00

## 2019-05-04 RX ADMIN — LEVOTHYROXINE SODIUM 50 MCG: 50 TABLET ORAL at 06:00

## 2019-05-04 RX ADMIN — Medication 10 ML: at 21:49

## 2019-05-04 RX ADMIN — PANTOPRAZOLE SODIUM 40 MG: 40 TABLET, DELAYED RELEASE ORAL at 15:48

## 2019-05-04 RX ADMIN — METOCLOPRAMIDE HYDROCHLORIDE 5 MG: 10 TABLET ORAL at 06:00

## 2019-05-04 RX ADMIN — CARBIDOPA AND LEVODOPA 1 TABLET: 25; 100 TABLET ORAL at 21:44

## 2019-05-04 ASSESSMENT — PAIN DESCRIPTION - PROGRESSION: CLINICAL_PROGRESSION: NOT CHANGED

## 2019-05-04 ASSESSMENT — PAIN DESCRIPTION - DESCRIPTORS
DESCRIPTORS: TIGHTNESS
DESCRIPTORS: ACHING;TIGHTNESS

## 2019-05-04 ASSESSMENT — PAIN SCALES - GENERAL
PAINLEVEL_OUTOF10: 2
PAINLEVEL_OUTOF10: 6
PAINLEVEL_OUTOF10: 3
PAINLEVEL_OUTOF10: 5

## 2019-05-04 ASSESSMENT — PAIN DESCRIPTION - FREQUENCY: FREQUENCY: INTERMITTENT

## 2019-05-04 ASSESSMENT — PAIN DESCRIPTION - PAIN TYPE
TYPE: ACUTE PAIN
TYPE: ACUTE PAIN

## 2019-05-04 ASSESSMENT — PAIN DESCRIPTION - LOCATION
LOCATION: CHEST
LOCATION: CHEST

## 2019-05-04 ASSESSMENT — PAIN - FUNCTIONAL ASSESSMENT: PAIN_FUNCTIONAL_ASSESSMENT: ACTIVITIES ARE NOT PREVENTED

## 2019-05-04 ASSESSMENT — PAIN DESCRIPTION - ORIENTATION: ORIENTATION: MID;UPPER

## 2019-05-04 NOTE — PROGRESS NOTES
sodium chloride (PF)  10 mL Intravenous BID    dicyclomine  10 mg Oral TID AC    sodium chloride flush  10 mL Intravenous 2 times per day    levothyroxine  50 mcg Oral QAM AC    [START ON 5/5/2019] vitamin D  50,000 Units Oral Weekly    sertraline  200 mg Oral Daily    rOPINIRole  1 mg Oral TID    QUEtiapine  800 mg Oral Nightly    paliperidone  6 mg Oral QAM    gabapentin  100 mg Oral Nightly    folic acid  1 mg Oral Daily    docusate sodium  100 mg Oral BID    divalproex  500 mg Oral BID    carbidopa-levodopa  1 tablet Oral TID    apixaban  5 mg Oral BID     Continuous Infusions:   lactated ringers       PRN Meds:.sodium chloride flush, magnesium hydroxide, ondansetron, acetaminophen, traMADol    PHYSICAL EXAM:  BP 99/64   Pulse 88   Temp 97.9 °F (36.6 °C) (Oral)   Resp 16   Ht 5' 2\" (1.575 m)   Wt 237 lb 7 oz (107.7 kg)   SpO2 96%   Breastfeeding? No   BMI 43.43 kg/m²       Intake/Output Summary (Last 24 hours) at 5/4/2019 1808  Last data filed at 5/4/2019 0701  Gross per 24 hour   Intake 1522.5 ml   Output 1500 ml   Net 22.5 ml       General: Alert and oriented. Resting in bed in no apparent distress. obese  HEENT: Normocephalic. Atraumatic. Pupils equal and reactive. EOM intact. Oral mucosa pink/moist/intact. Neck: Supple. Symmetrical. Trachea midline. Lungs: Clear to auscultation bilaterally. Respirations even and unlabored. Chest: Exam unremarkable. Cardiac: S1/S2 noted. Regular Rhythm and rate. Abdomen/GI: Soft. Non-tender. Non-distended. BS+. Actively vomiting on my assessment  Extremities: PP+. Atraumatic. No redness/cyanosis/edema noted. Brisk cap refill. Skin: Dry and intact. No lesions noted. Neuro: Grossly intact. No focal deficits noted.      LABS:    Lab Results   Component Value Date    WBC 8.2 05/04/2019    HGB 8.6 (L) 05/04/2019    HCT 26.3 (L) 05/04/2019    MCV 91.8 05/04/2019     05/04/2019    LYMPHOPCT 9.6 05/04/2019    RBC 2.86 (L) 05/04/2019    [facility] 30.1 05/04/2019    MCHC 32.8 05/04/2019    RDW 14.1 05/04/2019       Lab Results   Component Value Date    CREATININE 1.4 (H) 05/04/2019    BUN 25 (H) 05/04/2019     05/04/2019    K 4.7 05/04/2019     05/04/2019    CO2 20 (L) 05/04/2019       No results found for: MG    Lab Results   Component Value Date    ALT 16 02/25/2014    AST 19 02/25/2014    ALKPHOS 101 02/25/2014    BILITOT 0.10 02/25/2014        No flowsheet data found. Imaging:  NM Cardiac Stress Test Nuclear Imaging   Final Result      XR CHEST STANDARD (2 VW)   Final Result   No evidence of acute cardiopulmonary disease. Assessment & Plan:      Chest pain  - sound atypical but had + stress test  - troponins x3 negative  - Cardiology consulted for further evaluation  - monitor on tele    Anemia   - this is likely chronic and related to CKD  - Hgb trends in the 8-9 range  - monitor for now    CKD stage 3b  - Cr stable and baseline around 1.2  - avoid nephrotoxins  - cont fluids as she continues to have emesis and do not want to precipitate PHANI, changed to LR    Bipolar disorder  - cont Depakote/Seroquel    Hypothyroidism, unspecified  - Continue levothyroxine    History of pulmonary emboli  - On chronic anticoagulation    Morbidly obese  Body mass index is 43.43 kg/m². The patient and / or the family were informed of the results of any tests, a time was given to answer questions, a plan was proposed and they agreed with plan.     DVT prophylaxis: [x] Lovenox  [] SQ Heparin  [] SCDs because of  [] warfarin/oral direct thrombin inhibitor [] Encourage ambulation    GI prophylaxis: [] [id number]  [x] not indicated    Probiotic if on abx: [] Yes [] No [x] Not Indicated    Diet: DIET CLEAR LIQUID;    Consults:  IP CONSULT TO CARDIOLOGY    Disposition:  [] Home  [] Home with home health [] Rehab [] Psych [] SNF  [] LTAC  [] Long term nursing home or group home [] Transfer to ICU  [] Transfer to PCU [] Other:    Code Status: Full Code    ELOS: zoraida Bañuelos, APRN - CNP  05/04/19

## 2019-05-04 NOTE — PLAN OF CARE
Problem: Falls - Risk of:  Goal: Will remain free from falls  Description  Will remain free from falls  Outcome: Ongoing   Patient uses call light appropriately to express needs. Bed to lowest position with door open and call light in reach. All fall precautions implemented at this time. Bed alarm on for safety. Siderails up x2. Non skid footwear in place. Patient has had no falls this shift. Will continue to monitor. Problem: Pain Control  Goal: Maintain pain level at or below patient's acceptable level (or 5 if patient is unable to determine acceptable level)  Outcome: Ongoing   Pain/discomfort being managed with PRN analgesics per MD orders. Pt able to express presence and absence of pain and rate pain appropriately using numerical scale. Problem: Cardiovascular  Goal: No DVT, peripheral vascular complications  Outcome: Ongoing   Eliquis as ordered. Problem: Skin Integrity/Risk  Goal: No skin breakdown during hospitalization  Outcome: Ongoing   Skin care protocal in place.   Problem: Pain:  Goal: Pain level will decrease  Description  Pain level will decrease  5/3/2019 2019 by Adelso Kumar RN  Outcome: Ongoing

## 2019-05-04 NOTE — PLAN OF CARE
Problem: Pain:  Goal: Pain level will decrease  Description  Pain level will decrease  Outcome: Ongoing   Pain/Discomfort is being managed with PRN analgesics per MD orders (See MAR). Patient is able to express and rate pain using numerical scale.

## 2019-05-05 PROBLEM — R07.9 CHEST PAIN: Status: ACTIVE | Noted: 2019-05-05

## 2019-05-05 LAB
ANION GAP SERPL CALCULATED.3IONS-SCNC: 11 MMOL/L (ref 3–16)
BASOPHILS ABSOLUTE: 0 K/UL (ref 0–0.2)
BASOPHILS RELATIVE PERCENT: 0.1 %
BUN BLDV-MCNC: 23 MG/DL (ref 7–20)
CALCIUM SERPL-MCNC: 8.6 MG/DL (ref 8.3–10.6)
CHLORIDE BLD-SCNC: 106 MMOL/L (ref 99–110)
CHOLESTEROL, TOTAL: 177 MG/DL (ref 0–199)
CO2: 24 MMOL/L (ref 21–32)
CREAT SERPL-MCNC: 1.4 MG/DL (ref 0.6–1.2)
EOSINOPHILS ABSOLUTE: 0 K/UL (ref 0–0.6)
EOSINOPHILS RELATIVE PERCENT: 0.5 %
GFR AFRICAN AMERICAN: 46
GFR NON-AFRICAN AMERICAN: 38
GLUCOSE BLD-MCNC: 88 MG/DL (ref 70–99)
HCT VFR BLD CALC: 25.7 % (ref 36–48)
HDLC SERPL-MCNC: 63 MG/DL (ref 40–60)
HEMOCCULT SP2 STL QL: NORMAL
HEMOGLOBIN: 8.4 G/DL (ref 12–16)
LDL CHOLESTEROL CALCULATED: 86 MG/DL
LYMPHOCYTES ABSOLUTE: 1.1 K/UL (ref 1–5.1)
LYMPHOCYTES RELATIVE PERCENT: 15.9 %
MCH RBC QN AUTO: 30.2 PG (ref 26–34)
MCHC RBC AUTO-ENTMCNC: 32.8 G/DL (ref 31–36)
MCV RBC AUTO: 92 FL (ref 80–100)
MONOCYTES ABSOLUTE: 0.7 K/UL (ref 0–1.3)
MONOCYTES RELATIVE PERCENT: 10.6 %
NEUTROPHILS ABSOLUTE: 5 K/UL (ref 1.7–7.7)
NEUTROPHILS RELATIVE PERCENT: 72.9 %
OCCULT BLOOD SCREENING: NORMAL
PDW BLD-RTO: 14.4 % (ref 12.4–15.4)
PLATELET # BLD: 204 K/UL (ref 135–450)
PMV BLD AUTO: 7 FL (ref 5–10.5)
POTASSIUM REFLEX MAGNESIUM: 4.7 MMOL/L (ref 3.5–5.1)
RBC # BLD: 2.8 M/UL (ref 4–5.2)
SODIUM BLD-SCNC: 141 MMOL/L (ref 136–145)
SOLUBLE TRANSFERRIN RECEPT: 7.4 MG/L (ref 1.9–4.4)
TRIGL SERPL-MCNC: 142 MG/DL (ref 0–150)
VLDLC SERPL CALC-MCNC: 28 MG/DL
WBC # BLD: 6.9 K/UL (ref 4–11)

## 2019-05-05 PROCEDURE — 80048 BASIC METABOLIC PNL TOTAL CA: CPT

## 2019-05-05 PROCEDURE — 94760 N-INVAS EAR/PLS OXIMETRY 1: CPT

## 2019-05-05 PROCEDURE — 2580000003 HC RX 258: Performed by: NURSE PRACTITIONER

## 2019-05-05 PROCEDURE — 96376 TX/PRO/DX INJ SAME DRUG ADON: CPT

## 2019-05-05 PROCEDURE — 85025 COMPLETE CBC W/AUTO DIFF WBC: CPT

## 2019-05-05 PROCEDURE — G0328 FECAL BLOOD SCRN IMMUNOASSAY: HCPCS

## 2019-05-05 PROCEDURE — 1200000000 HC SEMI PRIVATE

## 2019-05-05 PROCEDURE — C9113 INJ PANTOPRAZOLE SODIUM, VIA: HCPCS | Performed by: NURSE PRACTITIONER

## 2019-05-05 PROCEDURE — 36415 COLL VENOUS BLD VENIPUNCTURE: CPT

## 2019-05-05 PROCEDURE — 80061 LIPID PANEL: CPT

## 2019-05-05 PROCEDURE — 2580000003 HC RX 258: Performed by: INTERNAL MEDICINE

## 2019-05-05 PROCEDURE — 96361 HYDRATE IV INFUSION ADD-ON: CPT

## 2019-05-05 PROCEDURE — 99223 1ST HOSP IP/OBS HIGH 75: CPT | Performed by: INTERNAL MEDICINE

## 2019-05-05 PROCEDURE — 6360000002 HC RX W HCPCS: Performed by: NURSE PRACTITIONER

## 2019-05-05 PROCEDURE — G0378 HOSPITAL OBSERVATION PER HR: HCPCS

## 2019-05-05 PROCEDURE — 6370000000 HC RX 637 (ALT 250 FOR IP): Performed by: INTERNAL MEDICINE

## 2019-05-05 RX ORDER — ATORVASTATIN CALCIUM 40 MG/1
40 TABLET, FILM COATED ORAL NIGHTLY
Status: DISCONTINUED | OUTPATIENT
Start: 2019-05-05 | End: 2019-05-07 | Stop reason: HOSPADM

## 2019-05-05 RX ADMIN — CARBIDOPA AND LEVODOPA 1 TABLET: 25; 100 TABLET ORAL at 08:26

## 2019-05-05 RX ADMIN — DOCUSATE SODIUM 100 MG: 100 CAPSULE, LIQUID FILLED ORAL at 08:27

## 2019-05-05 RX ADMIN — GABAPENTIN 100 MG: 100 CAPSULE ORAL at 22:41

## 2019-05-05 RX ADMIN — PANTOPRAZOLE SODIUM 40 MG: 40 INJECTION, POWDER, LYOPHILIZED, FOR SOLUTION INTRAVENOUS at 08:29

## 2019-05-05 RX ADMIN — TRAMADOL HYDROCHLORIDE 50 MG: 50 TABLET, FILM COATED ORAL at 10:06

## 2019-05-05 RX ADMIN — CARBIDOPA AND LEVODOPA 1 TABLET: 25; 100 TABLET ORAL at 13:51

## 2019-05-05 RX ADMIN — DICYCLOMINE HYDROCHLORIDE 10 MG: 10 CAPSULE ORAL at 07:03

## 2019-05-05 RX ADMIN — APIXABAN 5 MG: 5 TABLET, FILM COATED ORAL at 22:41

## 2019-05-05 RX ADMIN — METOCLOPRAMIDE 10 MG: 5 INJECTION, SOLUTION INTRAMUSCULAR; INTRAVENOUS at 07:03

## 2019-05-05 RX ADMIN — SODIUM CHLORIDE, POTASSIUM CHLORIDE, SODIUM LACTATE AND CALCIUM CHLORIDE: 600; 310; 30; 20 INJECTION, SOLUTION INTRAVENOUS at 07:07

## 2019-05-05 RX ADMIN — QUETIAPINE FUMARATE 800 MG: 200 TABLET ORAL at 22:41

## 2019-05-05 RX ADMIN — PALIPERIDONE 6 MG: 3 TABLET, EXTENDED RELEASE ORAL at 08:29

## 2019-05-05 RX ADMIN — Medication 10 ML: at 22:37

## 2019-05-05 RX ADMIN — DICYCLOMINE HYDROCHLORIDE 10 MG: 10 CAPSULE ORAL at 10:06

## 2019-05-05 RX ADMIN — CARBIDOPA AND LEVODOPA 1 TABLET: 25; 100 TABLET ORAL at 22:40

## 2019-05-05 RX ADMIN — DICYCLOMINE HYDROCHLORIDE 10 MG: 10 CAPSULE ORAL at 16:48

## 2019-05-05 RX ADMIN — Medication 10 ML: at 08:29

## 2019-05-05 RX ADMIN — DIVALPROEX SODIUM 500 MG: 500 TABLET, EXTENDED RELEASE ORAL at 08:26

## 2019-05-05 RX ADMIN — ROPINIROLE HYDROCHLORIDE 1 MG: 1 TABLET, FILM COATED ORAL at 08:27

## 2019-05-05 RX ADMIN — SERTRALINE 200 MG: 100 TABLET, FILM COATED ORAL at 08:26

## 2019-05-05 RX ADMIN — DIVALPROEX SODIUM 500 MG: 500 TABLET, EXTENDED RELEASE ORAL at 22:40

## 2019-05-05 RX ADMIN — ERGOCALCIFEROL 50000 UNITS: 1.25 CAPSULE ORAL at 08:26

## 2019-05-05 RX ADMIN — FOLIC ACID 1 MG: 1 TABLET ORAL at 08:27

## 2019-05-05 RX ADMIN — PANTOPRAZOLE SODIUM 40 MG: 40 INJECTION, POWDER, LYOPHILIZED, FOR SOLUTION INTRAVENOUS at 22:37

## 2019-05-05 RX ADMIN — Medication 10 ML: at 22:38

## 2019-05-05 RX ADMIN — ROPINIROLE HYDROCHLORIDE 1 MG: 1 TABLET, FILM COATED ORAL at 22:41

## 2019-05-05 RX ADMIN — ROPINIROLE HYDROCHLORIDE 1 MG: 1 TABLET, FILM COATED ORAL at 13:51

## 2019-05-05 RX ADMIN — ATORVASTATIN CALCIUM 40 MG: 40 TABLET, FILM COATED ORAL at 22:40

## 2019-05-05 RX ADMIN — METOCLOPRAMIDE 10 MG: 5 INJECTION, SOLUTION INTRAMUSCULAR; INTRAVENOUS at 13:51

## 2019-05-05 RX ADMIN — TRAMADOL HYDROCHLORIDE 50 MG: 50 TABLET, FILM COATED ORAL at 22:50

## 2019-05-05 RX ADMIN — SODIUM CHLORIDE, POTASSIUM CHLORIDE, SODIUM LACTATE AND CALCIUM CHLORIDE: 600; 310; 30; 20 INJECTION, SOLUTION INTRAVENOUS at 20:51

## 2019-05-05 RX ADMIN — LEVOTHYROXINE SODIUM 50 MCG: 50 TABLET ORAL at 07:03

## 2019-05-05 RX ADMIN — METOCLOPRAMIDE 10 MG: 5 INJECTION, SOLUTION INTRAMUSCULAR; INTRAVENOUS at 01:39

## 2019-05-05 RX ADMIN — APIXABAN 5 MG: 5 TABLET, FILM COATED ORAL at 08:26

## 2019-05-05 ASSESSMENT — PAIN - FUNCTIONAL ASSESSMENT
PAIN_FUNCTIONAL_ASSESSMENT: PREVENTS OR INTERFERES SOME ACTIVE ACTIVITIES AND ADLS
PAIN_FUNCTIONAL_ASSESSMENT: PREVENTS OR INTERFERES SOME ACTIVE ACTIVITIES AND ADLS

## 2019-05-05 ASSESSMENT — PAIN DESCRIPTION - DESCRIPTORS
DESCRIPTORS: TIGHTNESS
DESCRIPTORS: ACHING
DESCRIPTORS: ACHING

## 2019-05-05 ASSESSMENT — PAIN SCALES - GENERAL
PAINLEVEL_OUTOF10: 5
PAINLEVEL_OUTOF10: 5
PAINLEVEL_OUTOF10: 2
PAINLEVEL_OUTOF10: 8
PAINLEVEL_OUTOF10: 0

## 2019-05-05 ASSESSMENT — PAIN DESCRIPTION - LOCATION
LOCATION: KNEE
LOCATION: CHEST
LOCATION: KNEE

## 2019-05-05 ASSESSMENT — PAIN DESCRIPTION - ORIENTATION
ORIENTATION: RIGHT
ORIENTATION: RIGHT

## 2019-05-05 ASSESSMENT — PAIN DESCRIPTION - PROGRESSION: CLINICAL_PROGRESSION: NOT CHANGED

## 2019-05-05 ASSESSMENT — PAIN DESCRIPTION - ONSET: ONSET: ON-GOING

## 2019-05-05 ASSESSMENT — PAIN DESCRIPTION - PAIN TYPE
TYPE: CHRONIC PAIN
TYPE: CHRONIC PAIN

## 2019-05-05 ASSESSMENT — PAIN DESCRIPTION - FREQUENCY
FREQUENCY: INTERMITTENT
FREQUENCY: INTERMITTENT

## 2019-05-05 NOTE — PROGRESS NOTES
Intravenous BID    dicyclomine  10 mg Oral TID AC    sodium chloride flush  10 mL Intravenous 2 times per day    levothyroxine  50 mcg Oral QAM AC    vitamin D  50,000 Units Oral Weekly    sertraline  200 mg Oral Daily    rOPINIRole  1 mg Oral TID    QUEtiapine  800 mg Oral Nightly    paliperidone  6 mg Oral QAM    gabapentin  100 mg Oral Nightly    folic acid  1 mg Oral Daily    docusate sodium  100 mg Oral BID    divalproex  500 mg Oral BID    carbidopa-levodopa  1 tablet Oral TID    apixaban  5 mg Oral BID     Continuous Infusions:   lactated ringers 50 mL/hr at 05/05/19 1300     PRN Meds:.sodium chloride flush, magnesium hydroxide, ondansetron, acetaminophen, traMADol    PHYSICAL EXAM:  BP (!) 125/90   Pulse 77   Temp 97.8 °F (36.6 °C) (Oral)   Resp 15   Ht 5' 2\" (1.575 m)   Wt 238 lb 12.1 oz (108.3 kg)   SpO2 94%   Breastfeeding? No   BMI 43.67 kg/m²       Intake/Output Summary (Last 24 hours) at 5/5/2019 1410  Last data filed at 5/5/2019 0847  Gross per 24 hour   Intake 920 ml   Output 700 ml   Net 220 ml       General: Alert and oriented. Resting in bed in no apparent distress. obese  HEENT: Normocephalic. Atraumatic. Pupils equal and reactive. EOM intact. Oral mucosa pink/moist/intact. Neck: Supple. Symmetrical. Trachea midline. Lungs: Clear to auscultation bilaterally. Respirations even and unlabored. Chest: Exam unremarkable. Cardiac: S1/S2 noted. Regular Rhythm and rate. Abdomen/GI: Soft. Non-tender. Non-distended. BS+. Extremities: PP+. Atraumatic. No redness/cyanosis/edema noted. Brisk cap refill. Skin: Dry and intact. No lesions noted. Neuro: Grossly intact. No focal deficits noted.      LABS:    Lab Results   Component Value Date    WBC 6.9 05/05/2019    HGB 8.4 (L) 05/05/2019    HCT 25.7 (L) 05/05/2019    MCV 92.0 05/05/2019     05/05/2019    LYMPHOPCT 15.9 05/05/2019    RBC 2.80 (L) 05/05/2019    MCH 30.2 05/05/2019    MCHC 32.8 05/05/2019    RDW 14.4 05/05/2019 Lab Results   Component Value Date    CREATININE 1.4 (H) 05/05/2019    BUN 23 (H) 05/05/2019     05/05/2019    K 4.7 05/05/2019     05/05/2019    CO2 24 05/05/2019       No results found for: MG    Lab Results   Component Value Date    ALT 16 02/25/2014    AST 19 02/25/2014    ALKPHOS 101 02/25/2014    BILITOT 0.10 02/25/2014        No flowsheet data found. Imaging:  NM Cardiac Stress Test Nuclear Imaging   Final Result      XR CHEST STANDARD (2 VW)   Final Result   No evidence of acute cardiopulmonary disease. Assessment & Plan:      Chest pain  - sounds atypical but had + stress test  - troponins x3 negative  - Cardiology consulted for further evaluation- cath in am  - monitor on tele    Anemia   - this is likely chronic and related to CKD  - Hgb trends in the 8-9 range  - monitor for now    CKD stage 3b  - Cr stable and baseline around 1.2  - avoid nephrotoxins  - cont fluids as she continues to have emesis and do not want to precipitate PHANI, changed to LR  - decreased fluid rate today    Bipolar disorder  - cont Depakote/Seroquel    Hypothyroidism, unspecified  - Continue levothyroxine    History of pulmonary emboli  - On chronic anticoagulation    Morbidly obese  Body mass index is 43.67 kg/m². The patient and / or the family were informed of the results of any tests, a time was given to answer questions, a plan was proposed and they agreed with plan.     DVT prophylaxis: [x] Lovenox  [] SQ Heparin  [] SCDs because of  [] warfarin/oral direct thrombin inhibitor [] Encourage ambulation    GI prophylaxis: [] [id number]  [x] not indicated    Probiotic if on abx: [] Yes [] No [x] Not Indicated    Diet: Diet NPO, After Midnight  DIET CARDIAC;    Consults:  IP CONSULT TO CARDIOLOGY    Disposition:  [] Home  [] Home with home health [] Rehab [] Psych [] SNF  [] LTAC  [] Long term nursing home or group home [] Transfer to ICU  [] Transfer to PCU [] Other:    Code Status: Full Code    ELOS: zoraida Martins, APRN - CNP  05/05/19

## 2019-05-05 NOTE — PLAN OF CARE
Problem: Falls - Risk of:  Goal: Will remain free from falls  Description  Will remain free from falls  Outcome: Ongoing  Note:   Pt remains free from  falls this shift. Goal: Absence of physical injury  Description  Absence of physical injury  Outcome: Ongoing     Problem: Pain Control  Goal: Maintain pain level at or below patient's acceptable level (or 5 if patient is unable to determine acceptable level)  Outcome: Ongoing  Note:   Pt able to express presence/absence of pain and rate pain appropriately using numerical scale. Pain/discomfort being managed with PRN analgesics per MD orders (see MAR). Pain assessed every shift and after interventions.        Problem: Cardiovascular  Goal: No DVT, peripheral vascular complications  Outcome: Ongoing     Problem: Skin Integrity/Risk  Goal: No skin breakdown during hospitalization  Outcome: Ongoing     Problem: Pain:  Goal: Pain level will decrease  Description  Pain level will decrease  Outcome: Ongoing  Goal: Control of acute pain  Description  Control of acute pain  Outcome: Ongoing  Goal: Control of chronic pain  Description  Control of chronic pain  Outcome: Ongoing

## 2019-05-06 LAB
ANION GAP SERPL CALCULATED.3IONS-SCNC: 11 MMOL/L (ref 3–16)
BASOPHILS ABSOLUTE: 0 K/UL (ref 0–0.2)
BASOPHILS RELATIVE PERCENT: 0.3 %
BUN BLDV-MCNC: 19 MG/DL (ref 7–20)
CALCIUM SERPL-MCNC: 8.5 MG/DL (ref 8.3–10.6)
CHLORIDE BLD-SCNC: 108 MMOL/L (ref 99–110)
CO2: 23 MMOL/L (ref 21–32)
CREAT SERPL-MCNC: 1.1 MG/DL (ref 0.6–1.2)
EKG ATRIAL RATE: 97 BPM
EKG DIAGNOSIS: NORMAL
EKG P AXIS: 50 DEGREES
EKG P-R INTERVAL: 132 MS
EKG Q-T INTERVAL: 354 MS
EKG QRS DURATION: 84 MS
EKG QTC CALCULATION (BAZETT): 449 MS
EKG R AXIS: 34 DEGREES
EKG T AXIS: 96 DEGREES
EKG VENTRICULAR RATE: 97 BPM
EOSINOPHILS ABSOLUTE: 0.1 K/UL (ref 0–0.6)
EOSINOPHILS RELATIVE PERCENT: 1.4 %
GFR AFRICAN AMERICAN: >60
GFR NON-AFRICAN AMERICAN: 50
GLUCOSE BLD-MCNC: 92 MG/DL (ref 70–99)
HCT VFR BLD CALC: 26.5 % (ref 36–48)
HEMOGLOBIN: 8.6 G/DL (ref 12–16)
LEFT VENTRICULAR EJECTION FRACTION HIGH VALUE: 60 %
LEFT VENTRICULAR EJECTION FRACTION MODE: NORMAL
LV EF: 55 %
LYMPHOCYTES ABSOLUTE: 1.2 K/UL (ref 1–5.1)
LYMPHOCYTES RELATIVE PERCENT: 16.6 %
MCH RBC QN AUTO: 29.6 PG (ref 26–34)
MCHC RBC AUTO-ENTMCNC: 32.4 G/DL (ref 31–36)
MCV RBC AUTO: 91.4 FL (ref 80–100)
MONOCYTES ABSOLUTE: 0.8 K/UL (ref 0–1.3)
MONOCYTES RELATIVE PERCENT: 10.8 %
NEUTROPHILS ABSOLUTE: 5.1 K/UL (ref 1.7–7.7)
NEUTROPHILS RELATIVE PERCENT: 70.9 %
PDW BLD-RTO: 13.9 % (ref 12.4–15.4)
PLATELET # BLD: 222 K/UL (ref 135–450)
PMV BLD AUTO: 6.9 FL (ref 5–10.5)
POTASSIUM REFLEX MAGNESIUM: 4.4 MMOL/L (ref 3.5–5.1)
RBC # BLD: 2.9 M/UL (ref 4–5.2)
SODIUM BLD-SCNC: 142 MMOL/L (ref 136–145)
WBC # BLD: 7.3 K/UL (ref 4–11)

## 2019-05-06 PROCEDURE — 2500000003 HC RX 250 WO HCPCS

## 2019-05-06 PROCEDURE — 99153 MOD SED SAME PHYS/QHP EA: CPT

## 2019-05-06 PROCEDURE — 1200000000 HC SEMI PRIVATE

## 2019-05-06 PROCEDURE — 36415 COLL VENOUS BLD VENIPUNCTURE: CPT

## 2019-05-06 PROCEDURE — 93458 L HRT ARTERY/VENTRICLE ANGIO: CPT | Performed by: INTERNAL MEDICINE

## 2019-05-06 PROCEDURE — 85025 COMPLETE CBC W/AUTO DIFF WBC: CPT

## 2019-05-06 PROCEDURE — 4A023N7 MEASUREMENT OF CARDIAC SAMPLING AND PRESSURE, LEFT HEART, PERCUTANEOUS APPROACH: ICD-10-PCS | Performed by: INTERNAL MEDICINE

## 2019-05-06 PROCEDURE — C9113 INJ PANTOPRAZOLE SODIUM, VIA: HCPCS | Performed by: NURSE PRACTITIONER

## 2019-05-06 PROCEDURE — 6370000000 HC RX 637 (ALT 250 FOR IP): Performed by: INTERNAL MEDICINE

## 2019-05-06 PROCEDURE — 2580000003 HC RX 258

## 2019-05-06 PROCEDURE — C1894 INTRO/SHEATH, NON-LASER: HCPCS

## 2019-05-06 PROCEDURE — 6360000002 HC RX W HCPCS: Performed by: NURSE PRACTITIONER

## 2019-05-06 PROCEDURE — 94760 N-INVAS EAR/PLS OXIMETRY 1: CPT

## 2019-05-06 PROCEDURE — B2151ZZ FLUOROSCOPY OF LEFT HEART USING LOW OSMOLAR CONTRAST: ICD-10-PCS | Performed by: INTERNAL MEDICINE

## 2019-05-06 PROCEDURE — 6360000002 HC RX W HCPCS

## 2019-05-06 PROCEDURE — 6360000002 HC RX W HCPCS: Performed by: INTERNAL MEDICINE

## 2019-05-06 PROCEDURE — B2111ZZ FLUOROSCOPY OF MULTIPLE CORONARY ARTERIES USING LOW OSMOLAR CONTRAST: ICD-10-PCS | Performed by: INTERNAL MEDICINE

## 2019-05-06 PROCEDURE — 2709999900 HC NON-CHARGEABLE SUPPLY

## 2019-05-06 PROCEDURE — C1769 GUIDE WIRE: HCPCS

## 2019-05-06 PROCEDURE — C1887 CATHETER, GUIDING: HCPCS

## 2019-05-06 PROCEDURE — 6370000000 HC RX 637 (ALT 250 FOR IP): Performed by: NURSE PRACTITIONER

## 2019-05-06 PROCEDURE — 93458 L HRT ARTERY/VENTRICLE ANGIO: CPT

## 2019-05-06 PROCEDURE — 80048 BASIC METABOLIC PNL TOTAL CA: CPT

## 2019-05-06 PROCEDURE — 99152 MOD SED SAME PHYS/QHP 5/>YRS: CPT

## 2019-05-06 PROCEDURE — 2580000003 HC RX 258: Performed by: NURSE PRACTITIONER

## 2019-05-06 RX ORDER — SODIUM CHLORIDE 0.9 % (FLUSH) 0.9 %
10 SYRINGE (ML) INJECTION PRN
Status: CANCELLED | OUTPATIENT
Start: 2019-05-06

## 2019-05-06 RX ORDER — ONDANSETRON 2 MG/ML
4 INJECTION INTRAMUSCULAR; INTRAVENOUS EVERY 6 HOURS PRN
Status: CANCELLED | OUTPATIENT
Start: 2019-05-06

## 2019-05-06 RX ORDER — ACETAMINOPHEN 325 MG/1
650 TABLET ORAL EVERY 4 HOURS PRN
Status: CANCELLED | OUTPATIENT
Start: 2019-05-06

## 2019-05-06 RX ORDER — SUCRALFATE 1 G/1
1 TABLET ORAL EVERY 6 HOURS SCHEDULED
Status: DISCONTINUED | OUTPATIENT
Start: 2019-05-06 | End: 2019-05-07 | Stop reason: HOSPADM

## 2019-05-06 RX ORDER — SODIUM CHLORIDE 0.9 % (FLUSH) 0.9 %
10 SYRINGE (ML) INJECTION EVERY 12 HOURS SCHEDULED
Status: CANCELLED | OUTPATIENT
Start: 2019-05-06

## 2019-05-06 RX ADMIN — CARBIDOPA AND LEVODOPA 1 TABLET: 25; 100 TABLET ORAL at 15:39

## 2019-05-06 RX ADMIN — ONDANSETRON 4 MG: 2 INJECTION INTRAMUSCULAR; INTRAVENOUS at 15:35

## 2019-05-06 RX ADMIN — CARBIDOPA AND LEVODOPA 1 TABLET: 25; 100 TABLET ORAL at 10:13

## 2019-05-06 RX ADMIN — DIVALPROEX SODIUM 500 MG: 500 TABLET, EXTENDED RELEASE ORAL at 10:13

## 2019-05-06 RX ADMIN — DIVALPROEX SODIUM 500 MG: 500 TABLET, EXTENDED RELEASE ORAL at 23:09

## 2019-05-06 RX ADMIN — ROPINIROLE HYDROCHLORIDE 1 MG: 1 TABLET, FILM COATED ORAL at 23:09

## 2019-05-06 RX ADMIN — FOLIC ACID 1 MG: 1 TABLET ORAL at 10:14

## 2019-05-06 RX ADMIN — DICYCLOMINE HYDROCHLORIDE 10 MG: 10 CAPSULE ORAL at 06:31

## 2019-05-06 RX ADMIN — GABAPENTIN 100 MG: 100 CAPSULE ORAL at 23:09

## 2019-05-06 RX ADMIN — Medication 10 ML: at 23:10

## 2019-05-06 RX ADMIN — ATORVASTATIN CALCIUM 40 MG: 40 TABLET, FILM COATED ORAL at 23:09

## 2019-05-06 RX ADMIN — ROPINIROLE HYDROCHLORIDE 1 MG: 1 TABLET, FILM COATED ORAL at 15:39

## 2019-05-06 RX ADMIN — PALIPERIDONE 6 MG: 3 TABLET, EXTENDED RELEASE ORAL at 10:15

## 2019-05-06 RX ADMIN — SUCRALFATE 1 G: 1 TABLET ORAL at 23:09

## 2019-05-06 RX ADMIN — ROPINIROLE HYDROCHLORIDE 1 MG: 1 TABLET, FILM COATED ORAL at 10:13

## 2019-05-06 RX ADMIN — DOCUSATE SODIUM 100 MG: 100 CAPSULE, LIQUID FILLED ORAL at 23:09

## 2019-05-06 RX ADMIN — SUCRALFATE 1 G: 1 TABLET ORAL at 19:52

## 2019-05-06 RX ADMIN — CARBIDOPA AND LEVODOPA 1 TABLET: 25; 100 TABLET ORAL at 23:12

## 2019-05-06 RX ADMIN — LEVOTHYROXINE SODIUM 50 MCG: 50 TABLET ORAL at 06:31

## 2019-05-06 RX ADMIN — Medication 10 ML: at 10:15

## 2019-05-06 RX ADMIN — QUETIAPINE FUMARATE 800 MG: 200 TABLET ORAL at 23:10

## 2019-05-06 RX ADMIN — PANTOPRAZOLE SODIUM 40 MG: 40 INJECTION, POWDER, LYOPHILIZED, FOR SOLUTION INTRAVENOUS at 23:10

## 2019-05-06 RX ADMIN — DICYCLOMINE HYDROCHLORIDE 10 MG: 10 CAPSULE ORAL at 15:39

## 2019-05-06 RX ADMIN — SERTRALINE 200 MG: 100 TABLET, FILM COATED ORAL at 10:13

## 2019-05-06 RX ADMIN — APIXABAN 5 MG: 5 TABLET, FILM COATED ORAL at 23:09

## 2019-05-06 RX ADMIN — PANTOPRAZOLE SODIUM 40 MG: 40 INJECTION, POWDER, LYOPHILIZED, FOR SOLUTION INTRAVENOUS at 10:14

## 2019-05-06 RX ADMIN — TRAMADOL HYDROCHLORIDE 50 MG: 50 TABLET, FILM COATED ORAL at 10:14

## 2019-05-06 ASSESSMENT — PAIN SCALES - GENERAL
PAINLEVEL_OUTOF10: 0
PAINLEVEL_OUTOF10: 4
PAINLEVEL_OUTOF10: 9

## 2019-05-06 NOTE — PROCEDURES
[address] [place], [place] [id number]                            CARDIAC CATHETERIZATION    PATIENT NAME: Nellie Monroe                      :        1955  MED REC NO:   [id number]                          ROOM:       4106  ACCOUNT NO:   [de-identified]                           ADMIT DATE: 2019  PROVIDER:     Laura Arias MD    DATE OF PROCEDURE:  2019    PROCEDURE NOTE:  The patient was explained benefits and risks of the  procedure. Informed consent was obtained. After performing Erick's  test, the right radial artery was punctured using Seldinger technique. 5/6-Belgian sheath was placed in the right radial artery. Coronary  angiography was carried out with the help of 5-Belgian Tiger and 5-Belgian  JL 3.5 diagnostic catheters. Multiple views in ESPINOZA and Belarusian projection  with cranial and caudal angulations were obtained. Angled pigtail  catheter was used for a left ventriculogram.  Pressure in the left  ventricular cavity was obtained before and after left ventriculogram.   There was no gradient across the aortic valve noted. The patient's  angiogram was carefully reviewed and all the catheters were removed. The patient had a radial band applied and hemostasis was obtained. The  patient left the cath lab in a stable condition. HEMODYNAMIC DATA:  Please see the computerized printout. CORONARY ANGIOGRAPHY:  1. Right coronary artery. It arises from right sinus of Valsalva. It  is a large dominant artery, gives rise to posterior descending and  posterolateral branches. The right coronary artery and its branches are  free of atherosclerosis. 2.  Left main trunk. It divides into the left anterior descending  artery, left circumflex artery. Left main trunk is free of  atherosclerosis. 3.  Left anterior descending artery.   Moderate-sized artery, descending  in the interventricular sulcus and wraps around the apex of the heart. The left anterior descending artery and its branches are free of  atherosclerosis. 4.  Left circumflex artery. It is a moderate to large-sized artery. It  gives rise to a moderate-size obtuse marginal branch. The left  circumflex artery and its branches are free of atherosclerosis. LEFT VENTRICULOGRAM:  Reveals a normal left ventricular systolic  function with estimated EF of 50% to 55%. OVERALL IMPRESSION:  1. Patent left main trunk. 2.  Patent left anterior descending artery and its branches. 3.  Patent circumflex artery and its branches. 4.  Patent right coronary artery and its branches. 5.  Normal left ventricular systolic function with estimated EF of 50%  to 55% with normal left heart hemodynamics. In view of the above findings, we will consider the patient's stress  test to be falsely positive and symptoms to be atypical for angina.         Dana Dennison MD    D: 05/06/2019 14:05:45       T: 05/06/2019 16:46:53     [id number]  Job#: [id number]     Doc#: [id number]    CC:  MD Christian Hector MD

## 2019-05-06 NOTE — PROGRESS NOTES
Pt has scheduled Colace due at this time. Pt has had several BMs overnight. Held scheduled Colace. Will continue to monitor.

## 2019-05-06 NOTE — PLAN OF CARE
Problem: Falls - Risk of:  Goal: Will remain free from falls  Description  Will remain free from falls  Outcome: Ongoing  Goal: Absence of physical injury  Description  Absence of physical injury  Outcome: Ongoing     Problem: Pain Control  Goal: Maintain pain level at or below patient's acceptable level (or 5 if patient is unable to determine acceptable level)  Outcome: Ongoing     Problem: Skin Integrity/Risk  Goal: No skin breakdown during hospitalization  Outcome: Ongoing     Problem: Pain:  Goal: Pain level will decrease  Description  Pain level will decrease  Outcome: Ongoing

## 2019-05-06 NOTE — PRE SEDATION
Brief Pre-Op Note/Sedation Assessment      Herman Pacheco  1955  [id number]  [id number]  12:26 PM    Planned Procedure: Cardiac Catheterization Procedure    Post Procedure Plan: Return to same level of care    Consent: I have discussed with the patient and/or the patient representative the indication, alternatives, and the possible risks and/or complications of the planned procedure and the anesthesia methods. The patient and/or patient representative appear to understand and agree to proceed. Chief Complaint: Chest Pain/Pressure      Indications for Cath Procedure:  New Onset Angina <= 2 months  Anginal Classification within 2 weeks:  CCS III - Symptoms with everyday living activities, i.e., moderate limitation  NYHA Heart Failure Class within 2 weeks: No symptoms    Anti- Anginal Meds within 2 weeks:   Yes: Statin (Any)    Stress or Imaging Studies Performed:  Stress Test with SPECT Result: Positive:  apical and inferior Risk/Extent of Ischemia:  Intermediate    Vital Signs:  /78   Pulse 78   Temp 98.7 °F (37.1 °C)   Resp 18   Ht 5' 2\" (1.575 m)   Wt 238 lb 1.6 oz (108 kg)   SpO2 97%   Breastfeeding? No   BMI 43.55 kg/m²     Allergies: Allergies   Allergen Reactions    Procaine Hcl      Dizziness. States has had since with no problems.        Past Medical History:  Past Medical History:   Diagnosis Date    Arthritis     Asthma     Bipolar [facility] disorder ([facility] [address]     Fibromyalgia     Glaucoma     Hypertension     Migraine     Neuropathy     PE (pulmonary thromboembolism) ([facility] [address] 2015    Seizures ([facility] [address]     Thyroid disease     Ulcer disease          Surgical History:  Past Surgical History:   Procedure Laterality Date    ANKLE FRACTURE SURGERY      FOOT SURGERY      GASTRIC BYPASS SURGERY      SHOULDER SURGERY      rotator cuff    WRIST FRACTURE SURGERY           Medications:  Current Facility-Administered Medications   Medication Dose Route Frequency Provider Last Rate Last Dose  atorvastatin (LIPITOR) tablet 40 mg  40 mg Oral Nightly Susan Orlando MD   40 mg at 05/05/19 2240    pantoprazole (PROTONIX) injection 40 mg  40 mg Intravenous BID Zimmer Martins, APRN - CNP   40 mg at 05/06/19 1014    And    sodium chloride (PF) 0.9 % injection 10 mL  10 mL Intravenous BID Zimmer Martins, APRN - CNP   10 mL at 05/06/19 1015    lactated ringers infusion   Intravenous Continuous Zimmer Martins, APRN - CNP 50 mL/hr at 05/05/19 2051      dicyclomine (BENTYL) capsule 10 mg  10 mg Oral TID AC Tiff Faust MD   10 mg at 05/06/19 0631    sodium chloride flush 0.9 % injection 10 mL  10 mL Intravenous 2 times per day Tiff Faust MD   10 mL at 05/05/19 2238    sodium chloride flush 0.9 % injection 10 mL  10 mL Intravenous PRN Tiff Faust MD        magnesium hydroxide (MILK OF MAGNESIA) 400 MG/5ML suspension 30 mL  30 mL Oral Daily PRN Tiff Faust MD        ond[facility]etron [facility]) injection 4 mg  4 mg Intravenous Q4H PRN Tiff Faust MD   4 mg at 05/04/19 1222    acetaminophen (TYLENOL) tablet 650 mg  650 mg Oral Q4H PRN Tiff Faust MD   650 mg at 05/03/19 2346    levothyroxine (SYNTHROID) tablet 50 mcg  50 mcg Oral QAM AC Tiff Faust MD   50 mcg at 05/06/19 0631    vitamin D (ERGOCALCIFEROL) capsule 50,000 Units  50,000 Units Oral Weekly Tiff Faust MD   50,000 Units at 05/05/19 0826    traMADol (ULTRAM) tablet 50 mg  50 mg Oral Q6H PRN Tiff Faust MD   50 mg at 05/06/19 1014    sertraline (ZOLOFT) tablet 200 mg  200 mg Oral Daily Tiff Faust MD   200 mg at 05/06/19 1013    rOPINIRole (REQUIP) tablet 1 mg  1 mg Oral TID Tiff Faust MD   1 mg at 05/06/19 1013    QUEtiapine (SEROQUEL) tablet 800 mg  800 mg Oral Nightly Tiff Faust MD   800 mg at 05/05/19 2241    paliperidone (INVEGA) extended release tablet 6 mg  6 mg Oral QAM Tiff Faust MD   6 mg at 05/06/19 1015    gabapentin (NEURONTIN) capsule 100 mg  100 mg Oral Nightly Mc Vines MD Jon   100 mg at 01/00/16 7557    folic acid (FOLVITE) tablet 1 mg  1 mg Oral Daily Amador Arias MD   1 mg at 05/06/19 1014    docusate sodium (COLACE) capsule 100 mg  100 mg Oral BID Amador Arias MD   100 mg at 05/05/19 0827    divalproex (DEPAKOTE ER) extended release tablet 500 mg  500 mg Oral BID Amador Arias MD   500 mg at 05/06/19 1013    carbidopa-levodopa (SINEMET)  MG per tablet 1 tablet  1 tablet Oral TID Amador Arias MD   1 tablet at 05/06/19 1013    apixaban (ELIQUIS) tablet 5 mg  5 mg Oral BID Amador Arias MD   5 mg at 05/05/19 4557           Pre-Sedation:    Pre-Sedation Documentation and Exam:  I have personally completed a history, physical exam & review of systems for this patient (see notes). Prior History of Anesthesia Complications:   none    Modified Mallampati:  I (soft palate, uvula, fauces, tonsillar pillars visible)    ASA Classification:  Class 2 - A normal healthy patient with mild systemic disease and Class 2 -- A normal healthy patient with mild systemic disease    Reagan Scale: Activity:  2 - Able to move 4 extremities voluntarily on command  Respiration:  2 - Able to breathe deeply and cough freely  Circulation:  2 - BP+/- 20mmHg of normal  Consciousness:  2 - Fully awake  Oxygen Saturation (color):  2 - Able to maintain oxygen saturation >92% on room air    Sedation/Anesthesia Plan:  Guard the patient's safety and welfare. Minimize physical discomfort and pain. Minimize negative psychological responses to treatment by providing sedation and analgesia and maximize the potential amnesia. Patient to meet pre-procedure discharge plan.     Medication Planned:  midazolam intravenously and fentanyl intravenously    Patient is an appropriate candidate for plan of sedation: yes      Electronically signed by Kimberly Echevarria MD on 5/6/2019 at 12:26 PM

## 2019-05-06 NOTE — PROGRESS NOTES
05/06/2019       Lab Results   Component Value Date    CREATININE 1.1 05/06/2019    BUN 19 05/06/2019     05/06/2019    K 4.4 05/06/2019     05/06/2019    CO2 23 05/06/2019       No results found for: MG    Lab Results   Component Value Date    ALT 16 02/25/2014    AST 19 02/25/2014    ALKPHOS 101 02/25/2014    BILITOT 0.10 02/25/2014        No flowsheet data found. Imaging:  NM Cardiac Stress Test Nuclear Imaging   Final Result      XR CHEST STANDARD (2 VW)   Final Result   No evidence of acute cardiopulmonary disease. Assessment & Plan:      Chest pain  - Trop <0.01 x 3  - NM stress +  - Cardiology following. Appreciate assistance  - Heart cath 5/6/19 normal  - Continue current medications  - Component of GERD? GERD  - PPI. Add carafate    Anemia   - this is likely chronic and related to CKD  - Hgb trends in the 8-9 range  - monitor     CKD stage 3b  - Cr stable and baseline around 1.2  - avoid nephrotoxins  - IVF    Bipolar disorder  - Depakote/Seroquel    Hypothyroidism  - Levothyroxine    History of pulmonary emboli  - On chronic anticoagulation    Obesity with BMI of 43.6  [] Obesity - ?30 kg/m2  [] Class I - 30.0 to 34.9 kg/m2  [] Class II - 35.0 to 39.9 kg/m2  [x] Class III - ?40 kg/m2 (also referred to as severe, extreme, morbid or massive obesity)   [] Super Obesity  - > 50% kg/m2  [] Super Super Obesity  - > 84% kg/m2  Complicating assessment and treatment. Obesity Places the patient at risk for multiple co-morbidities as well as early death and may be contributing to the patient's presentation. Supportive care. Encourage therapeutic lifestyle changes. The patient and / or the family were informed of the results of any tests, a time was given to answer questions, a plan was proposed and they agreed with plan.     DVT prophylaxis: [x] Lovenox  [] SQ Heparin  [] SCDs because of  [] warfarin/oral direct thrombin inhibitor [] Encourage ambulation    GI prophylaxis: [x] PPI/G3dqbdida  [] not indicated    Probiotic if on abx: [] Yes [] No [x] Not Indicated    Diet: Diet NPO, After Midnight    Consults:  IP CONSULT TO CARDIOLOGY    Disposition:  [x] Home  [] Home with home health [] Rehab [] Psych [] SNF  [] LTAC  [] Long term nursing home or group home [] Transfer to ICU  [] Transfer to PCU [] Other:    Code Status: Full Code    ELOS: Probable discharge tomorrow    JOHNNY Garcia NP  05/06/19

## 2019-05-06 NOTE — CARE COORDINATION
INITIAL CASE MANAGEMENT ASSESSMENT    Reviewed chart, met with patient to assess possible discharge needs. Explained Case Management role/services. Living Situation: Patient lives alone in an apartment with no LAURA. ADLs: Independent     DME: none    PT/OT Recs: not ordered     Active Services: [employer] homemaking 1 x week     Transportation: Friend provides transportation     Medications: [email or web address] [facility]/affordable    PCP: Dr. Benoit Muhammad      HD/PD: n/a    PLAN/COMMENTS: Patient plans to return to home with friend' support. SW/CM provided contact information for patient or family to call with any questions. SW/CM will follow and assist as needed. Electronically signed by Karen Jarrett RN on 5/6/2019 at 3:53 PM

## 2019-05-07 VITALS
SYSTOLIC BLOOD PRESSURE: 124 MMHG | HEART RATE: 99 BPM | TEMPERATURE: 98.5 F | OXYGEN SATURATION: 92 % | WEIGHT: 238.32 LBS | HEIGHT: 62 IN | RESPIRATION RATE: 18 BRPM | DIASTOLIC BLOOD PRESSURE: 80 MMHG | BODY MASS INDEX: 43.86 KG/M2

## 2019-05-07 LAB
ANION GAP SERPL CALCULATED.3IONS-SCNC: 12 MMOL/L (ref 3–16)
BASOPHILS ABSOLUTE: 0 K/UL (ref 0–0.2)
BASOPHILS RELATIVE PERCENT: 0.6 %
BUN BLDV-MCNC: 18 MG/DL (ref 7–20)
CALCIUM SERPL-MCNC: 8.7 MG/DL (ref 8.3–10.6)
CHLORIDE BLD-SCNC: 104 MMOL/L (ref 99–110)
CO2: 25 MMOL/L (ref 21–32)
CREAT SERPL-MCNC: 1.1 MG/DL (ref 0.6–1.2)
EOSINOPHILS ABSOLUTE: 0.2 K/UL (ref 0–0.6)
EOSINOPHILS RELATIVE PERCENT: 2.3 %
GFR AFRICAN AMERICAN: >60
GFR NON-AFRICAN AMERICAN: 50
GLUCOSE BLD-MCNC: 87 MG/DL (ref 70–99)
HCT VFR BLD CALC: 28.6 % (ref 36–48)
HEMOGLOBIN: 9.3 G/DL (ref 12–16)
LYMPHOCYTES ABSOLUTE: 1.1 K/UL (ref 1–5.1)
LYMPHOCYTES RELATIVE PERCENT: 14.3 %
MCH RBC QN AUTO: 29.8 PG (ref 26–34)
MCHC RBC AUTO-ENTMCNC: 32.4 G/DL (ref 31–36)
MCV RBC AUTO: 91.9 FL (ref 80–100)
MONOCYTES ABSOLUTE: 0.9 K/UL (ref 0–1.3)
MONOCYTES RELATIVE PERCENT: 11.6 %
NEUTROPHILS ABSOLUTE: 5.5 K/UL (ref 1.7–7.7)
NEUTROPHILS RELATIVE PERCENT: 71.2 %
PDW BLD-RTO: 14.2 % (ref 12.4–15.4)
PLATELET # BLD: 223 K/UL (ref 135–450)
PMV BLD AUTO: 7.3 FL (ref 5–10.5)
POTASSIUM REFLEX MAGNESIUM: 5 MMOL/L (ref 3.5–5.1)
RBC # BLD: 3.11 M/UL (ref 4–5.2)
SODIUM BLD-SCNC: 141 MMOL/L (ref 136–145)
WBC # BLD: 7.8 K/UL (ref 4–11)

## 2019-05-07 PROCEDURE — 96376 TX/PRO/DX INJ SAME DRUG ADON: CPT

## 2019-05-07 PROCEDURE — 6370000000 HC RX 637 (ALT 250 FOR IP): Performed by: NURSE PRACTITIONER

## 2019-05-07 PROCEDURE — 2580000003 HC RX 258: Performed by: NURSE PRACTITIONER

## 2019-05-07 PROCEDURE — 36415 COLL VENOUS BLD VENIPUNCTURE: CPT

## 2019-05-07 PROCEDURE — 6370000000 HC RX 637 (ALT 250 FOR IP): Performed by: INTERNAL MEDICINE

## 2019-05-07 PROCEDURE — 6360000002 HC RX W HCPCS: Performed by: NURSE PRACTITIONER

## 2019-05-07 PROCEDURE — 94760 N-INVAS EAR/PLS OXIMETRY 1: CPT

## 2019-05-07 PROCEDURE — 6360000004 HC RX CONTRAST MEDICATION: Performed by: INTERNAL MEDICINE

## 2019-05-07 PROCEDURE — 80048 BASIC METABOLIC PNL TOTAL CA: CPT

## 2019-05-07 PROCEDURE — C9113 INJ PANTOPRAZOLE SODIUM, VIA: HCPCS | Performed by: NURSE PRACTITIONER

## 2019-05-07 PROCEDURE — 85025 COMPLETE CBC W/AUTO DIFF WBC: CPT

## 2019-05-07 RX ORDER — SUCRALFATE 1 G/1
1 TABLET ORAL 4 TIMES DAILY
Qty: 120 TABLET | Refills: 0 | Status: SHIPPED | OUTPATIENT
Start: 2019-05-07 | End: 2019-06-06

## 2019-05-07 RX ORDER — PANTOPRAZOLE SODIUM 20 MG/1
40 TABLET, DELAYED RELEASE ORAL DAILY
Qty: 30 TABLET | Refills: 3 | Status: SHIPPED | OUTPATIENT
Start: 2019-05-07

## 2019-05-07 RX ORDER — ERGOCALCIFEROL (VITAMIN D2) 1250 MCG
50000 CAPSULE ORAL WEEKLY
Qty: 4 CAPSULE | Refills: 0 | Status: SHIPPED | OUTPATIENT
Start: 2019-05-12 | End: 2019-06-11

## 2019-05-07 RX ORDER — ATORVASTATIN CALCIUM 40 MG/1
40 TABLET, FILM COATED ORAL NIGHTLY
Qty: 30 TABLET | Refills: 0 | Status: SHIPPED | OUTPATIENT
Start: 2019-05-07 | End: 2019-06-06

## 2019-05-07 RX ADMIN — SUCRALFATE 1 G: 1 TABLET ORAL at 06:38

## 2019-05-07 RX ADMIN — APIXABAN 5 MG: 5 TABLET, FILM COATED ORAL at 08:26

## 2019-05-07 RX ADMIN — LEVOTHYROXINE SODIUM 50 MCG: 50 TABLET ORAL at 06:38

## 2019-05-07 RX ADMIN — FOLIC ACID 1 MG: 1 TABLET ORAL at 08:26

## 2019-05-07 RX ADMIN — PALIPERIDONE 6 MG: 3 TABLET, EXTENDED RELEASE ORAL at 08:26

## 2019-05-07 RX ADMIN — PANTOPRAZOLE SODIUM 40 MG: 40 INJECTION, POWDER, LYOPHILIZED, FOR SOLUTION INTRAVENOUS at 08:26

## 2019-05-07 RX ADMIN — DIVALPROEX SODIUM 500 MG: 500 TABLET, EXTENDED RELEASE ORAL at 08:26

## 2019-05-07 RX ADMIN — CARBIDOPA AND LEVODOPA 1 TABLET: 25; 100 TABLET ORAL at 14:24

## 2019-05-07 RX ADMIN — SODIUM CHLORIDE, POTASSIUM CHLORIDE, SODIUM LACTATE AND CALCIUM CHLORIDE: 600; 310; 30; 20 INJECTION, SOLUTION INTRAVENOUS at 08:27

## 2019-05-07 RX ADMIN — SUCRALFATE 1 G: 1 TABLET ORAL at 11:44

## 2019-05-07 RX ADMIN — ROPINIROLE HYDROCHLORIDE 1 MG: 1 TABLET, FILM COATED ORAL at 08:26

## 2019-05-07 RX ADMIN — IOPAMIDOL 54 ML: 755 INJECTION, SOLUTION INTRAVENOUS at 08:56

## 2019-05-07 RX ADMIN — DICYCLOMINE HYDROCHLORIDE 10 MG: 10 CAPSULE ORAL at 11:44

## 2019-05-07 RX ADMIN — TRAMADOL HYDROCHLORIDE 50 MG: 50 TABLET, FILM COATED ORAL at 08:26

## 2019-05-07 RX ADMIN — ROPINIROLE HYDROCHLORIDE 1 MG: 1 TABLET, FILM COATED ORAL at 14:24

## 2019-05-07 RX ADMIN — SERTRALINE 200 MG: 100 TABLET, FILM COATED ORAL at 08:26

## 2019-05-07 RX ADMIN — CARBIDOPA AND LEVODOPA 1 TABLET: 25; 100 TABLET ORAL at 08:26

## 2019-05-07 RX ADMIN — DICYCLOMINE HYDROCHLORIDE 10 MG: 10 CAPSULE ORAL at 06:38

## 2019-05-07 ASSESSMENT — PAIN DESCRIPTION - FREQUENCY: FREQUENCY: INTERMITTENT

## 2019-05-07 ASSESSMENT — PAIN DESCRIPTION - LOCATION: LOCATION: ABDOMEN

## 2019-05-07 ASSESSMENT — PAIN DESCRIPTION - ONSET: ONSET: GRADUAL

## 2019-05-07 ASSESSMENT — PAIN SCALES - GENERAL
PAINLEVEL_OUTOF10: 4
PAINLEVEL_OUTOF10: 6

## 2019-05-07 ASSESSMENT — PAIN DESCRIPTION - ORIENTATION: ORIENTATION: MID

## 2019-05-07 ASSESSMENT — PAIN DESCRIPTION - PAIN TYPE: TYPE: ACUTE PAIN

## 2019-05-07 ASSESSMENT — PAIN DESCRIPTION - DESCRIPTORS: DESCRIPTORS: CRAMPING

## 2019-05-07 ASSESSMENT — PAIN DESCRIPTION - PROGRESSION: CLINICAL_PROGRESSION: NOT CHANGED

## 2019-05-07 ASSESSMENT — PAIN - FUNCTIONAL ASSESSMENT: PAIN_FUNCTIONAL_ASSESSMENT: ACTIVITIES ARE NOT PREVENTED

## 2019-05-07 NOTE — PROGRESS NOTES
Pt discharged. Instructions reviewed, all questions answered. RX sent to pharmacy. IV removed, no complications, dressing in place. Pt wheeled to exit per transportation.

## 2019-05-07 NOTE — DISCHARGE SUMMARY
times daily      divalproex (DEPAKOTE ER) 500 MG extended release tablet Take 500 mg by mouth 2 times daily      metoclopramide (REGLAN) 5 MG tablet Take 5 mg by mouth 4 times daily      carbidopa-levodopa (SINEMET)  MG per tablet Take 1 tablet by mouth 3 times daily      Cholecalciferol (VITAMIN D3) 83114 units CAPS Take 1 capsule by mouth once a week Sundays      paliperidone (INVEGA) 6 MG extended release tablet Take 6 mg by mouth every morning      gabapentin (NEURONTIN) 100 MG capsule Take 100 mg by mouth nightly. folic acid (FOLVITE) 1 MG tablet Take 1 mg by mouth daily      levothyroxine (SYNTHROID) 50 MCG tablet Take 50 mcg by mouth Daily      docusate sodium (COLACE) 100 MG capsule Take 100 mg by mouth 2 times daily      traMADol (ULTRAM) 50 MG tablet Take 50 mg by mouth every 6 hours as needed for Pain. tiZANidine (ZANAFLEX) 4 MG tablet Take 4 mg by mouth every 6 hours as needed      potassium chloride (KLOR-CON) 20 MEQ packet Take 20 mEq by mouth 2 times daily      dicyclomine (BENTYL) 10 MG capsule Take 10 mg by mouth 3 times daily (before meals). omeprazole (PRILOSEC) 40 MG delayed release capsule 40 mg Daily       quetiapine (SEROQUEL) 400 MG tablet Take 800 mg by mouth nightly       Misc. Devices (WALKER) MISC 1 Device by Does not apply route continuous. Dispense and Fit for injury to lower extremity and weakness. Qty: 1 each, Refills: 0      rOPINIRole (REQUIP) 1 MG tablet Take 1 mg by mouth 3 times daily       Multiple Vitamin (MULTI-VITAMIN PO) Take  by mouth. sertraline (ZOLOFT) 100 MG tablet Take 200 mg by mouth daily              The patient was seen and examined on day of discharge and this discharge summary is in conjunction with any daily progress note from day of discharge. Hospital Course:  The patient is a 80-year-old female with a history of obesity, bipolar disorder, hypothyroidism, hypertension and pulmonary emboli in 2015 (on chronic anticoagulation), who presented to [facility] ER with complaints of chest pain. She stated that over the 4 weeks prior to admission, she had been experiencing intermittent CP. She described it as a burning sensation in the middle of her chest. The patient has a a history of GERD. EGD in January 2018 was normal. Her esophagus was dilated at that time. Troponins were trended ×3 and remained negative. EKG was nonacute. Stress test was performed on 5/3/19 and showed reversible ischemia. Cardiology was consulted. The patient underwent heart catheterization on 5/6/19, which was found to be negative. Carafate was initiated, and the patient did note some improvement with symptoms. Mid-chest pain/epigastric pain is likely gastritis/GERD. At this time, she is able to discharge to home in stable condition with OP f/u. Exam:     BP (!) 109/58   Pulse 80   Temp 98.4 °F (36.9 °C) (Oral)   Resp 18   Ht 5' 2\" (1.575 m)   Wt 238 lb 5.1 oz (108.1 kg)   SpO2 96%   Breastfeeding? No   BMI 43.59 kg/m²     General: Alert and oriented. Sitting up at EOB in NAD. Pleasant and cooperative. Morbidly obese. RN at bedside  HEENT: Normocephalic. Atraumatic. Pupils equal and reactive. EOM intact. Oral mucosa pink/moist/intact. Neck: Supple. Symmetrical. Trachea midline. Lungs: Clear to auscultation bilaterally. Respirations even and unlabored. Chest: Exam unremarkable. Cardiac: S1/S2 noted. Regular Rhythm and rate. Abdomen/GI: Soft. Non-tender. Non-distended. BS+. Extremities: PP+. Atraumatic. No redness/cyanosis/edema noted. Brisk cap refill. Cath site to R wrist. No bleeding or hematoma noted. Skin: Dry and intact. No lesions noted. Neuro: Grossly intact. No focal deficits noted. Consults:     IP CONSULT TO CARDIOLOGY    Significant Diagnostic Studies:       NM Cardiac Stress Test Nuclear Imaging   Final Result      XR CHEST STANDARD (2 VW)   Final Result   No evidence of acute cardiopulmonary disease.            5/3/19 NM Stress    Conclusions      Summary    Moderate size, moderate intensity, mid to distal inferior and apical wall,    primarily reversible defect.    Normal LV size and systolic function.    Left ventricular ejection fraction of 68 %.    There are no regional wall motion abnormalities.    Overall findings represent a intermediate risk scan.    Dr. Maria E Sung has been notified via [employer]     5/6/19 Cardiac Cath Report    Impression:     1.  Patent left main trunk. 2.  Patent left anterior descending artery and its branches. 3.  Patent circumflex artery and its branches. 4.  Patent right coronary artery and its branches. 5.  Normal left ventricular systolic function with estimated EF of 50%  to 55% with normal left heart hemodynamics. In view of the above findings, we will consider the patient's stress  test to be falsely positive and symptoms to be atypical for angina. Labs: For convenience and continuity at follow-up the following most recent labs are provided:    CBC:    Lab Results   Component Value Date    WBC 7.8 05/07/2019    HGB 9.3 05/07/2019    HCT 28.6 05/07/2019     05/07/2019       Renal:    Lab Results   Component Value Date     05/07/2019    K 5.0 05/07/2019     05/07/2019    CO2 25 05/07/2019    BUN 18 05/07/2019    CREATININE 1.1 05/07/2019    CALCIUM 8.7 05/07/2019       No future appointments. Time Spent on discharge is more than 45 minutes in the examination, evaluation, counseling and review of medications and discharge plan. RX monitoring reviewed N/A    Signed:    JOHNNY Lord NP   5/7/2019      Thank you Chester Cavanaugh for the opportunity to be involved in this patient's care. If you have any questions or concerns please feel free to contact me at [phone].

## 2019-05-07 NOTE — PROGRESS NOTES
Pt pit back to bed,gait steady. No c/o chest pain. Carafate started. Dressing to rt wrist intact,no bruising or edema noted. Monitor showing NSR. no EDEMA IN FEET. Call light in reach.

## 2019-06-02 PROBLEM — E86.0 DEHYDRATION: Status: RESOLVED | Noted: 2019-05-03 | Resolved: 2019-06-02

## 2020-04-15 ENCOUNTER — TELEPHONE (OUTPATIENT)
Dept: ORTHOPEDIC SURGERY | Age: 65
End: 2020-04-15

## 2020-04-15 NOTE — TELEPHONE ENCOUNTER
SCANNED A NO RECORDS STATEMENT FOR [facility] MEDICAL RECORDS FOR 5/25/17 TO PRESENT ONLY INTO [facility]O FOR COMPEX LEGAL ([id number])

## 2020-04-16 ENCOUNTER — TELEPHONE (OUTPATIENT)
Dept: ORTHOPEDIC SURGERY | Age: 65
End: 2020-04-16

## 2020-04-16 NOTE — TELEPHONE ENCOUNTER
SCANNED A NO RECORDS STATEMENT FOR [facility] X-RAYS FROM 1/1/07 TO PRESENT FOR COMPEX LEGAL ([id number])